# Patient Record
Sex: MALE | Race: WHITE | NOT HISPANIC OR LATINO | URBAN - METROPOLITAN AREA
[De-identification: names, ages, dates, MRNs, and addresses within clinical notes are randomized per-mention and may not be internally consistent; named-entity substitution may affect disease eponyms.]

---

## 2017-06-04 ENCOUNTER — EMERGENCY (EMERGENCY)
Facility: HOSPITAL | Age: 67
LOS: 1 days | Discharge: HOME | End: 2017-06-04
Attending: EMERGENCY MEDICINE | Admitting: EMERGENCY MEDICINE
Payer: COMMERCIAL

## 2017-06-04 VITALS
DIASTOLIC BLOOD PRESSURE: 77 MMHG | TEMPERATURE: 98 F | OXYGEN SATURATION: 96 % | RESPIRATION RATE: 20 BRPM | HEART RATE: 101 BPM | SYSTOLIC BLOOD PRESSURE: 136 MMHG

## 2017-06-04 VITALS
DIASTOLIC BLOOD PRESSURE: 78 MMHG | HEART RATE: 100 BPM | OXYGEN SATURATION: 96 % | RESPIRATION RATE: 18 BRPM | SYSTOLIC BLOOD PRESSURE: 148 MMHG | TEMPERATURE: 97 F

## 2017-06-04 DIAGNOSIS — K80.50 CALCULUS OF BILE DUCT WITHOUT CHOLANGITIS OR CHOLECYSTITIS WITHOUT OBSTRUCTION: ICD-10-CM

## 2017-06-04 DIAGNOSIS — I10 ESSENTIAL (PRIMARY) HYPERTENSION: ICD-10-CM

## 2017-06-04 DIAGNOSIS — E11.9 TYPE 2 DIABETES MELLITUS WITHOUT COMPLICATIONS: ICD-10-CM

## 2017-06-04 DIAGNOSIS — R10.10 UPPER ABDOMINAL PAIN, UNSPECIFIED: ICD-10-CM

## 2017-06-04 DIAGNOSIS — Z79.899 OTHER LONG TERM (CURRENT) DRUG THERAPY: ICD-10-CM

## 2017-06-04 PROBLEM — Z00.00 ENCOUNTER FOR PREVENTIVE HEALTH EXAMINATION: Status: ACTIVE | Noted: 2017-06-04

## 2017-06-04 LAB
ALBUMIN SERPL ELPH-MCNC: 4.4 G/DL — SIGNIFICANT CHANGE UP (ref 3.3–5)
ALP SERPL-CCNC: 82 U/L — SIGNIFICANT CHANGE UP (ref 40–120)
ALT FLD-CCNC: 27 U/L — SIGNIFICANT CHANGE UP (ref 10–45)
ANION GAP SERPL CALC-SCNC: 14 MMOL/L — SIGNIFICANT CHANGE UP (ref 5–17)
AST SERPL-CCNC: 21 U/L — SIGNIFICANT CHANGE UP (ref 10–40)
BASOPHILS NFR BLD AUTO: 0.4 % — SIGNIFICANT CHANGE UP (ref 0–2)
BILIRUB SERPL-MCNC: 0.7 MG/DL — SIGNIFICANT CHANGE UP (ref 0.2–1.2)
BUN SERPL-MCNC: 21 MG/DL — SIGNIFICANT CHANGE UP (ref 7–23)
CALCIUM SERPL-MCNC: 9.4 MG/DL — SIGNIFICANT CHANGE UP (ref 8.4–10.5)
CHLORIDE SERPL-SCNC: 99 MMOL/L — SIGNIFICANT CHANGE UP (ref 96–108)
CO2 SERPL-SCNC: 25 MMOL/L — SIGNIFICANT CHANGE UP (ref 22–31)
CREAT SERPL-MCNC: 0.9 MG/DL — SIGNIFICANT CHANGE UP (ref 0.5–1.3)
EOSINOPHIL NFR BLD AUTO: 11.5 % — HIGH (ref 0–6)
GLUCOSE SERPL-MCNC: 214 MG/DL — HIGH (ref 70–99)
HCT VFR BLD CALC: 48.9 % — SIGNIFICANT CHANGE UP (ref 39–50)
HGB BLD-MCNC: 16.7 G/DL — SIGNIFICANT CHANGE UP (ref 13–17)
LIDOCAIN IGE QN: 50 U/L — SIGNIFICANT CHANGE UP (ref 7–60)
LYMPHOCYTES # BLD AUTO: 20.4 % — SIGNIFICANT CHANGE UP (ref 13–44)
MCHC RBC-ENTMCNC: 31.5 PG — SIGNIFICANT CHANGE UP (ref 27–34)
MCHC RBC-ENTMCNC: 34.2 G/DL — SIGNIFICANT CHANGE UP (ref 32–36)
MCV RBC AUTO: 92.1 FL — SIGNIFICANT CHANGE UP (ref 80–100)
MONOCYTES NFR BLD AUTO: 6.8 % — SIGNIFICANT CHANGE UP (ref 2–14)
NEUTROPHILS NFR BLD AUTO: 60.9 % — SIGNIFICANT CHANGE UP (ref 43–77)
PLATELET # BLD AUTO: 175 K/UL — SIGNIFICANT CHANGE UP (ref 150–400)
POTASSIUM SERPL-MCNC: 4.9 MMOL/L — SIGNIFICANT CHANGE UP (ref 3.5–5.3)
POTASSIUM SERPL-SCNC: 4.9 MMOL/L — SIGNIFICANT CHANGE UP (ref 3.5–5.3)
PROT SERPL-MCNC: 7.1 G/DL — SIGNIFICANT CHANGE UP (ref 6–8.3)
RBC # BLD: 5.31 M/UL — SIGNIFICANT CHANGE UP (ref 4.2–5.8)
RBC # FLD: 12.8 % — SIGNIFICANT CHANGE UP (ref 10.3–16.9)
SODIUM SERPL-SCNC: 138 MMOL/L — SIGNIFICANT CHANGE UP (ref 135–145)
WBC # BLD: 19.2 K/UL — HIGH (ref 3.8–10.5)
WBC # FLD AUTO: 19.2 K/UL — HIGH (ref 3.8–10.5)

## 2017-06-04 PROCEDURE — 85025 COMPLETE CBC W/AUTO DIFF WBC: CPT

## 2017-06-04 PROCEDURE — 99284 EMERGENCY DEPT VISIT MOD MDM: CPT | Mod: 25

## 2017-06-04 PROCEDURE — 83690 ASSAY OF LIPASE: CPT

## 2017-06-04 PROCEDURE — 76705 ECHO EXAM OF ABDOMEN: CPT

## 2017-06-04 PROCEDURE — 80053 COMPREHEN METABOLIC PANEL: CPT

## 2017-06-04 PROCEDURE — 99285 EMERGENCY DEPT VISIT HI MDM: CPT | Mod: 25

## 2017-06-04 PROCEDURE — 96374 THER/PROPH/DIAG INJ IV PUSH: CPT

## 2017-06-04 PROCEDURE — 96375 TX/PRO/DX INJ NEW DRUG ADDON: CPT

## 2017-06-04 PROCEDURE — 36415 COLL VENOUS BLD VENIPUNCTURE: CPT

## 2017-06-04 PROCEDURE — 76705 ECHO EXAM OF ABDOMEN: CPT | Mod: 26

## 2017-06-04 RX ORDER — LISINOPRIL 2.5 MG/1
1 TABLET ORAL
Qty: 0 | Refills: 0 | COMMUNITY

## 2017-06-04 RX ORDER — SODIUM CHLORIDE 9 MG/ML
1000 INJECTION INTRAMUSCULAR; INTRAVENOUS; SUBCUTANEOUS
Qty: 0 | Refills: 0 | Status: DISCONTINUED | OUTPATIENT
Start: 2017-06-04 | End: 2017-06-08

## 2017-06-04 RX ORDER — AMLODIPINE BESYLATE 2.5 MG/1
1 TABLET ORAL
Qty: 0 | Refills: 0 | COMMUNITY

## 2017-06-04 RX ORDER — FAMOTIDINE 10 MG/ML
20 INJECTION INTRAVENOUS ONCE
Qty: 0 | Refills: 0 | Status: COMPLETED | OUTPATIENT
Start: 2017-06-04 | End: 2017-06-04

## 2017-06-04 RX ORDER — MORPHINE SULFATE 50 MG/1
4 CAPSULE, EXTENDED RELEASE ORAL ONCE
Qty: 0 | Refills: 0 | Status: DISCONTINUED | OUTPATIENT
Start: 2017-06-04 | End: 2017-06-04

## 2017-06-04 RX ORDER — ONDANSETRON 8 MG/1
4 TABLET, FILM COATED ORAL ONCE
Qty: 0 | Refills: 0 | Status: COMPLETED | OUTPATIENT
Start: 2017-06-04 | End: 2017-06-04

## 2017-06-04 RX ORDER — SODIUM CHLORIDE 9 MG/ML
1000 INJECTION INTRAMUSCULAR; INTRAVENOUS; SUBCUTANEOUS ONCE
Qty: 0 | Refills: 0 | Status: COMPLETED | OUTPATIENT
Start: 2017-06-04 | End: 2017-06-04

## 2017-06-04 RX ADMIN — MORPHINE SULFATE 4 MILLIGRAM(S): 50 CAPSULE, EXTENDED RELEASE ORAL at 03:20

## 2017-06-04 RX ADMIN — SODIUM CHLORIDE 125 MILLILITER(S): 9 INJECTION INTRAMUSCULAR; INTRAVENOUS; SUBCUTANEOUS at 03:30

## 2017-06-04 RX ADMIN — FAMOTIDINE 20 MILLIGRAM(S): 10 INJECTION INTRAVENOUS at 02:55

## 2017-06-04 RX ADMIN — ONDANSETRON 4 MILLIGRAM(S): 8 TABLET, FILM COATED ORAL at 02:55

## 2017-06-04 RX ADMIN — MORPHINE SULFATE 4 MILLIGRAM(S): 50 CAPSULE, EXTENDED RELEASE ORAL at 02:55

## 2017-06-04 RX ADMIN — SODIUM CHLORIDE 2000 MILLILITER(S): 9 INJECTION INTRAMUSCULAR; INTRAVENOUS; SUBCUTANEOUS at 02:54

## 2017-06-04 NOTE — ED PROVIDER NOTE - OBJECTIVE STATEMENT
65 yo male h/o htn, dm c/o acute onset severe 9/10 sharp upper abd pain that he attributed to eating crescencio.  H/o similar pain once before w crescencio.  + h/o fried food intolerance.  No h/o gallstone, renal stone, pud.  No relief w gas medication at home.  No gerd sx, + n/v x 2 (nonbloody), no fever, no dysuria/freq/urgency.  Nl bm this evening w/o black/bloody stool.  No cp, palpitations, sob.

## 2017-06-04 NOTE — ED PROVIDER NOTE - PROGRESS NOTE DETAILS
WBC elevated but chem/lft's wnl, u/s w + gallstone w/o cholecystitis, pt states pain improved and no longer tender ruq/epigastric on repeat exam.  Will dc - discussed biliary colic, diet recommendations, reasons for return and fu w pmd, surg.  Pt afebrile, nontender, no infection on u/s - will hold on abx at this time.

## 2017-06-04 NOTE — ED ADULT NURSE NOTE - OBJECTIVE STATEMENT
67 y/o male presenting to ER c/o RUQ pain since this  morning, pt reports pain level 6/10, described as dull and constant at present time. PT denies vomiting but feeling nauseous. Abdomen soft, bowel sound present to all quadrants. IV line placed, blood work done and sent to lab, medications given as per MD Director's orders. awaiting US. Will continue to monitor.

## 2017-06-04 NOTE — ED ADULT TRIAGE NOTE - CHIEF COMPLAINT QUOTE
Pt presents with abd pain since last night.  Upon arrival NAD pt points with mid abd for pain 9/10.  Pt denies N/V/D

## 2017-06-04 NOTE — ED PROVIDER NOTE - MEDICAL DECISION MAKING DETAILS
Pt c/o abd pain w ttp ruq > epigastric, suspect biliary colic vs cholecystitis, poss gastritis, pancreatitis, no diarrhea to suggest colitis/enteritis.  Plan labs, pain/nausea meds, ruq u/s, reassess.

## 2017-07-05 VITALS
DIASTOLIC BLOOD PRESSURE: 64 MMHG | OXYGEN SATURATION: 96 % | TEMPERATURE: 98 F | RESPIRATION RATE: 18 BRPM | WEIGHT: 161.82 LBS | HEIGHT: 67 IN | SYSTOLIC BLOOD PRESSURE: 121 MMHG | HEART RATE: 90 BPM

## 2017-07-06 ENCOUNTER — INPATIENT (INPATIENT)
Facility: HOSPITAL | Age: 67
LOS: 3 days | Discharge: HOME | DRG: 419 | End: 2017-07-10
Attending: SURGERY | Admitting: SURGERY
Payer: COMMERCIAL

## 2017-07-06 ENCOUNTER — RESULT REVIEW (OUTPATIENT)
Age: 67
End: 2017-07-06

## 2017-07-06 DIAGNOSIS — K81.1 CHRONIC CHOLECYSTITIS: ICD-10-CM

## 2017-07-06 LAB
ALBUMIN SERPL ELPH-MCNC: 3.8 G/DL — SIGNIFICANT CHANGE UP (ref 3.3–5)
ALP SERPL-CCNC: 97 U/L — SIGNIFICANT CHANGE UP (ref 40–120)
ALT FLD-CCNC: 83 U/L — HIGH (ref 10–45)
ANION GAP SERPL CALC-SCNC: 11 MMOL/L — SIGNIFICANT CHANGE UP (ref 5–17)
AST SERPL-CCNC: 67 U/L — HIGH (ref 10–40)
BASOPHILS NFR BLD AUTO: 0.3 % — SIGNIFICANT CHANGE UP (ref 0–2)
BILIRUB SERPL-MCNC: 1.4 MG/DL — HIGH (ref 0.2–1.2)
BUN SERPL-MCNC: 7 MG/DL — SIGNIFICANT CHANGE UP (ref 7–23)
CALCIUM SERPL-MCNC: 8.6 MG/DL — SIGNIFICANT CHANGE UP (ref 8.4–10.5)
CHLORIDE SERPL-SCNC: 99 MMOL/L — SIGNIFICANT CHANGE UP (ref 96–108)
CO2 SERPL-SCNC: 26 MMOL/L — SIGNIFICANT CHANGE UP (ref 22–31)
CREAT SERPL-MCNC: 0.9 MG/DL — SIGNIFICANT CHANGE UP (ref 0.5–1.3)
EOSINOPHIL NFR BLD AUTO: 6.8 % — HIGH (ref 0–6)
GLUCOSE SERPL-MCNC: 118 MG/DL — HIGH (ref 70–99)
GRAM STN FLD: SIGNIFICANT CHANGE UP
HCT VFR BLD CALC: 43.9 % — SIGNIFICANT CHANGE UP (ref 39–50)
HGB BLD-MCNC: 14.8 G/DL — SIGNIFICANT CHANGE UP (ref 13–17)
LYMPHOCYTES # BLD AUTO: 22.9 % — SIGNIFICANT CHANGE UP (ref 13–44)
MAGNESIUM SERPL-MCNC: 1.2 MG/DL — LOW (ref 1.6–2.6)
MCHC RBC-ENTMCNC: 30.8 PG — SIGNIFICANT CHANGE UP (ref 27–34)
MCHC RBC-ENTMCNC: 33.7 G/DL — SIGNIFICANT CHANGE UP (ref 32–36)
MCV RBC AUTO: 91.5 FL — SIGNIFICANT CHANGE UP (ref 80–100)
MONOCYTES NFR BLD AUTO: 7.6 % — SIGNIFICANT CHANGE UP (ref 2–14)
NEUTROPHILS NFR BLD AUTO: 62.4 % — SIGNIFICANT CHANGE UP (ref 43–77)
PHOSPHATE SERPL-MCNC: 3.5 MG/DL — SIGNIFICANT CHANGE UP (ref 2.5–4.5)
PLATELET # BLD AUTO: 157 K/UL — SIGNIFICANT CHANGE UP (ref 150–400)
POTASSIUM SERPL-MCNC: 4.1 MMOL/L — SIGNIFICANT CHANGE UP (ref 3.5–5.3)
POTASSIUM SERPL-SCNC: 4.1 MMOL/L — SIGNIFICANT CHANGE UP (ref 3.5–5.3)
PROT SERPL-MCNC: 6.2 G/DL — SIGNIFICANT CHANGE UP (ref 6–8.3)
RBC # BLD: 4.8 M/UL — SIGNIFICANT CHANGE UP (ref 4.2–5.8)
RBC # FLD: 12.9 % — SIGNIFICANT CHANGE UP (ref 10.3–16.9)
SODIUM SERPL-SCNC: 136 MMOL/L — SIGNIFICANT CHANGE UP (ref 135–145)
SPECIMEN SOURCE: SIGNIFICANT CHANGE UP
WBC # BLD: 14.6 K/UL — HIGH (ref 3.8–10.5)
WBC # FLD AUTO: 14.6 K/UL — HIGH (ref 3.8–10.5)

## 2017-07-06 RX ORDER — GLUCAGON INJECTION, SOLUTION 0.5 MG/.1ML
1 INJECTION, SOLUTION SUBCUTANEOUS ONCE
Qty: 0 | Refills: 0 | Status: DISCONTINUED | OUTPATIENT
Start: 2017-07-06 | End: 2017-07-10

## 2017-07-06 RX ORDER — ONDANSETRON 8 MG/1
4 TABLET, FILM COATED ORAL EVERY 6 HOURS
Qty: 0 | Refills: 0 | Status: DISCONTINUED | OUTPATIENT
Start: 2017-07-06 | End: 2017-07-10

## 2017-07-06 RX ORDER — HEPARIN SODIUM 5000 [USP'U]/ML
5000 INJECTION INTRAVENOUS; SUBCUTANEOUS EVERY 8 HOURS
Qty: 0 | Refills: 0 | Status: DISCONTINUED | OUTPATIENT
Start: 2017-07-06 | End: 2017-07-10

## 2017-07-06 RX ORDER — MAGNESIUM SULFATE 500 MG/ML
2 VIAL (ML) INJECTION EVERY 6 HOURS
Qty: 0 | Refills: 0 | Status: COMPLETED | OUTPATIENT
Start: 2017-07-06 | End: 2017-07-07

## 2017-07-06 RX ORDER — INSULIN LISPRO 100/ML
VIAL (ML) SUBCUTANEOUS
Qty: 0 | Refills: 0 | Status: DISCONTINUED | OUTPATIENT
Start: 2017-07-06 | End: 2017-07-10

## 2017-07-06 RX ORDER — PIPERACILLIN AND TAZOBACTAM 4; .5 G/20ML; G/20ML
3.38 INJECTION, POWDER, LYOPHILIZED, FOR SOLUTION INTRAVENOUS ONCE
Qty: 0 | Refills: 0 | Status: COMPLETED | OUTPATIENT
Start: 2017-07-06 | End: 2017-07-06

## 2017-07-06 RX ORDER — PIPERACILLIN AND TAZOBACTAM 4; .5 G/20ML; G/20ML
3.38 INJECTION, POWDER, LYOPHILIZED, FOR SOLUTION INTRAVENOUS EVERY 6 HOURS
Qty: 0 | Refills: 0 | Status: DISCONTINUED | OUTPATIENT
Start: 2017-07-06 | End: 2017-07-10

## 2017-07-06 RX ORDER — DEXTROSE 50 % IN WATER 50 %
25 SYRINGE (ML) INTRAVENOUS ONCE
Qty: 0 | Refills: 0 | Status: DISCONTINUED | OUTPATIENT
Start: 2017-07-06 | End: 2017-07-10

## 2017-07-06 RX ORDER — OXYCODONE AND ACETAMINOPHEN 5; 325 MG/1; MG/1
2 TABLET ORAL EVERY 4 HOURS
Qty: 0 | Refills: 0 | Status: DISCONTINUED | OUTPATIENT
Start: 2017-07-06 | End: 2017-07-10

## 2017-07-06 RX ORDER — SODIUM CHLORIDE 9 MG/ML
1000 INJECTION, SOLUTION INTRAVENOUS
Qty: 0 | Refills: 0 | Status: DISCONTINUED | OUTPATIENT
Start: 2017-07-06 | End: 2017-07-10

## 2017-07-06 RX ORDER — HYDROMORPHONE HYDROCHLORIDE 2 MG/ML
0.5 INJECTION INTRAMUSCULAR; INTRAVENOUS; SUBCUTANEOUS ONCE
Qty: 0 | Refills: 0 | Status: DISCONTINUED | OUTPATIENT
Start: 2017-07-06 | End: 2017-07-10

## 2017-07-06 RX ORDER — OXYCODONE AND ACETAMINOPHEN 5; 325 MG/1; MG/1
1 TABLET ORAL EVERY 4 HOURS
Qty: 0 | Refills: 0 | Status: DISCONTINUED | OUTPATIENT
Start: 2017-07-06 | End: 2017-07-10

## 2017-07-06 RX ORDER — SODIUM CHLORIDE 9 MG/ML
1000 INJECTION, SOLUTION INTRAVENOUS
Qty: 0 | Refills: 0 | Status: DISCONTINUED | OUTPATIENT
Start: 2017-07-06 | End: 2017-07-09

## 2017-07-06 RX ORDER — DEXTROSE 50 % IN WATER 50 %
1 SYRINGE (ML) INTRAVENOUS ONCE
Qty: 0 | Refills: 0 | Status: DISCONTINUED | OUTPATIENT
Start: 2017-07-06 | End: 2017-07-10

## 2017-07-06 RX ORDER — DEXTROSE 50 % IN WATER 50 %
12.5 SYRINGE (ML) INTRAVENOUS ONCE
Qty: 0 | Refills: 0 | Status: DISCONTINUED | OUTPATIENT
Start: 2017-07-06 | End: 2017-07-10

## 2017-07-06 RX ORDER — TAMSULOSIN HYDROCHLORIDE 0.4 MG/1
0.4 CAPSULE ORAL AT BEDTIME
Qty: 0 | Refills: 0 | Status: DISCONTINUED | OUTPATIENT
Start: 2017-07-06 | End: 2017-07-10

## 2017-07-06 RX ADMIN — PIPERACILLIN AND TAZOBACTAM 200 GRAM(S): 4; .5 INJECTION, POWDER, LYOPHILIZED, FOR SOLUTION INTRAVENOUS at 18:00

## 2017-07-06 RX ADMIN — TAMSULOSIN HYDROCHLORIDE 0.4 MILLIGRAM(S): 0.4 CAPSULE ORAL at 22:37

## 2017-07-06 RX ADMIN — Medication 50 GRAM(S): at 23:42

## 2017-07-06 RX ADMIN — HEPARIN SODIUM 5000 UNIT(S): 5000 INJECTION INTRAVENOUS; SUBCUTANEOUS at 22:31

## 2017-07-06 RX ADMIN — PIPERACILLIN AND TAZOBACTAM 200 GRAM(S): 4; .5 INJECTION, POWDER, LYOPHILIZED, FOR SOLUTION INTRAVENOUS at 12:33

## 2017-07-06 RX ADMIN — HEPARIN SODIUM 5000 UNIT(S): 5000 INJECTION INTRAVENOUS; SUBCUTANEOUS at 14:00

## 2017-07-06 RX ADMIN — Medication: at 10:17

## 2017-07-06 NOTE — BRIEF OPERATIVE NOTE - OPERATION/FINDINGS
Top down approach for gangrenous cholecystitis identified. Clip and Endoloop used for cystic duct; clip x 2 used for cystic artery.

## 2017-07-06 NOTE — PROGRESS NOTE ADULT - ASSESSMENT
Assessment:66y Male s/p Laprascopic Cholecystectomy    Plan:  Pain/nausea control PRN  Home meds  Zosyn  Strict I & O's   SSI  Incentive spirometer/OOB/Ambulate  IVF, Diet: CLD  DVT PPx  AM labs  ToV @ 7 PM

## 2017-07-06 NOTE — PROGRESS NOTE ADULT - SUBJECTIVE AND OBJECTIVE BOX
Team 1 Surgery Post-Op Note, PCN:     Pre-Op Dx: Cholecystitis  Procedure: Cholecystectomy, laparoscopic    Surgeon: Dr. Chicas    Subjective: Doing well post-op. No N/V/D. Tolerating CLD. No complaints of pain.       Vital Signs Last 24 Hrs  T(C): 36.6 (06 Jul 2017 09:45), Max: 36.6 (06 Jul 2017 09:45)  T(F): 97.9 (06 Jul 2017 09:45), Max: 97.9 (06 Jul 2017 09:45)  HR: 93 (06 Jul 2017 09:45) (93 - 93)  BP: 108/62 (06 Jul 2017 09:45) (108/62 - 108/62)  BP(mean): --  RR: 16 (06 Jul 2017 09:45) (16 - 16)  SpO2: 96% (06 Jul 2017 09:45) (96% - 96%)    Physical Exam:  General: NAD, resting comfortably in bed  Pulmonary: Nonlabored breathing, no respiratory distress  Cardiovascular: NSR  Abdominal: soft, NT/ND  Extremities: WWP, normal strength  Neuro: A/O x 3, CNs II-XII grossly intact, no focal deficits, normal sensation  Pulses: palpable distal pulses      LABS:            CAPILLARY BLOOD GLUCOSE  211 (06 Jul 2017 09:45)      Radiology and Additional Studies:

## 2017-07-06 NOTE — H&P ADULT - HISTORY OF PRESENT ILLNESS
66YOM with HTN and DM (not insulin dependent) who presents with chronic abdominal pain due to chronic cholecystitis. He is asymptomatic upon presentation today.

## 2017-07-06 NOTE — H&P ADULT - PROBLEM SELECTOR PLAN 1
- Admit to surgery.   - Pain control. Nausea control.   - IVF.   - IS.   - CLD.   - I&Os.   - Zosyn.   - SSI.   - DVT prophylaxis.

## 2017-07-07 LAB
ALBUMIN SERPL ELPH-MCNC: 3.2 G/DL — LOW (ref 3.3–5)
ALBUMIN SERPL ELPH-MCNC: 3.4 G/DL — SIGNIFICANT CHANGE UP (ref 3.3–5)
ALP SERPL-CCNC: 108 U/L — SIGNIFICANT CHANGE UP (ref 40–120)
ALP SERPL-CCNC: 165 U/L — HIGH (ref 40–120)
ALT FLD-CCNC: 78 U/L — HIGH (ref 10–45)
ALT FLD-CCNC: 91 U/L — HIGH (ref 10–45)
ANION GAP SERPL CALC-SCNC: 12 MMOL/L — SIGNIFICANT CHANGE UP (ref 5–17)
ANION GAP SERPL CALC-SCNC: 12 MMOL/L — SIGNIFICANT CHANGE UP (ref 5–17)
AST SERPL-CCNC: 60 U/L — HIGH (ref 10–40)
AST SERPL-CCNC: 88 U/L — HIGH (ref 10–40)
BILIRUB SERPL-MCNC: 2.1 MG/DL — HIGH (ref 0.2–1.2)
BILIRUB SERPL-MCNC: 3.9 MG/DL — HIGH (ref 0.2–1.2)
BUN SERPL-MCNC: 7 MG/DL — SIGNIFICANT CHANGE UP (ref 7–23)
BUN SERPL-MCNC: 8 MG/DL — SIGNIFICANT CHANGE UP (ref 7–23)
CALCIUM SERPL-MCNC: 8.5 MG/DL — SIGNIFICANT CHANGE UP (ref 8.4–10.5)
CALCIUM SERPL-MCNC: 8.6 MG/DL — SIGNIFICANT CHANGE UP (ref 8.4–10.5)
CHLORIDE SERPL-SCNC: 96 MMOL/L — SIGNIFICANT CHANGE UP (ref 96–108)
CHLORIDE SERPL-SCNC: 99 MMOL/L — SIGNIFICANT CHANGE UP (ref 96–108)
CO2 SERPL-SCNC: 26 MMOL/L — SIGNIFICANT CHANGE UP (ref 22–31)
CO2 SERPL-SCNC: 27 MMOL/L — SIGNIFICANT CHANGE UP (ref 22–31)
CREAT SERPL-MCNC: 0.9 MG/DL — SIGNIFICANT CHANGE UP (ref 0.5–1.3)
CREAT SERPL-MCNC: 0.9 MG/DL — SIGNIFICANT CHANGE UP (ref 0.5–1.3)
GLUCOSE SERPL-MCNC: 154 MG/DL — HIGH (ref 70–99)
GLUCOSE SERPL-MCNC: 189 MG/DL — HIGH (ref 70–99)
HBA1C BLD-MCNC: 6.4 % — HIGH (ref 4–5.6)
HCT VFR BLD CALC: 43.1 % — SIGNIFICANT CHANGE UP (ref 39–50)
HCT VFR BLD CALC: 43.4 % — SIGNIFICANT CHANGE UP (ref 39–50)
HGB BLD-MCNC: 14.3 G/DL — SIGNIFICANT CHANGE UP (ref 13–17)
HGB BLD-MCNC: 14.7 G/DL — SIGNIFICANT CHANGE UP (ref 13–17)
MAGNESIUM SERPL-MCNC: 1.6 MG/DL — SIGNIFICANT CHANGE UP (ref 1.6–2.6)
MAGNESIUM SERPL-MCNC: 2.6 MG/DL — SIGNIFICANT CHANGE UP (ref 1.6–2.6)
MCHC RBC-ENTMCNC: 30.5 PG — SIGNIFICANT CHANGE UP (ref 27–34)
MCHC RBC-ENTMCNC: 31.3 PG — SIGNIFICANT CHANGE UP (ref 27–34)
MCHC RBC-ENTMCNC: 33.2 G/DL — SIGNIFICANT CHANGE UP (ref 32–36)
MCHC RBC-ENTMCNC: 33.9 G/DL — SIGNIFICANT CHANGE UP (ref 32–36)
MCV RBC AUTO: 91.9 FL — SIGNIFICANT CHANGE UP (ref 80–100)
MCV RBC AUTO: 92.5 FL — SIGNIFICANT CHANGE UP (ref 80–100)
PHOSPHATE SERPL-MCNC: 2.8 MG/DL — SIGNIFICANT CHANGE UP (ref 2.5–4.5)
PHOSPHATE SERPL-MCNC: 3.2 MG/DL — SIGNIFICANT CHANGE UP (ref 2.5–4.5)
PLATELET # BLD AUTO: 128 K/UL — LOW (ref 150–400)
PLATELET # BLD AUTO: 152 K/UL — SIGNIFICANT CHANGE UP (ref 150–400)
POTASSIUM SERPL-MCNC: 4.1 MMOL/L — SIGNIFICANT CHANGE UP (ref 3.5–5.3)
POTASSIUM SERPL-MCNC: 4.3 MMOL/L — SIGNIFICANT CHANGE UP (ref 3.5–5.3)
POTASSIUM SERPL-SCNC: 4.1 MMOL/L — SIGNIFICANT CHANGE UP (ref 3.5–5.3)
POTASSIUM SERPL-SCNC: 4.3 MMOL/L — SIGNIFICANT CHANGE UP (ref 3.5–5.3)
PROT SERPL-MCNC: 5.9 G/DL — LOW (ref 6–8.3)
PROT SERPL-MCNC: 6.2 G/DL — SIGNIFICANT CHANGE UP (ref 6–8.3)
RBC # BLD: 4.69 M/UL — SIGNIFICANT CHANGE UP (ref 4.2–5.8)
RBC # BLD: 4.69 M/UL — SIGNIFICANT CHANGE UP (ref 4.2–5.8)
RBC # FLD: 12.9 % — SIGNIFICANT CHANGE UP (ref 10.3–16.9)
RBC # FLD: 13.5 % — SIGNIFICANT CHANGE UP (ref 10.3–16.9)
SODIUM SERPL-SCNC: 135 MMOL/L — SIGNIFICANT CHANGE UP (ref 135–145)
SODIUM SERPL-SCNC: 137 MMOL/L — SIGNIFICANT CHANGE UP (ref 135–145)
WBC # BLD: 11.2 K/UL — HIGH (ref 3.8–10.5)
WBC # BLD: 12.2 K/UL — HIGH (ref 3.8–10.5)
WBC # FLD AUTO: 11.2 K/UL — HIGH (ref 3.8–10.5)
WBC # FLD AUTO: 12.2 K/UL — HIGH (ref 3.8–10.5)

## 2017-07-07 RX ORDER — MAGNESIUM SULFATE 500 MG/ML
2 VIAL (ML) INJECTION EVERY 6 HOURS
Qty: 0 | Refills: 0 | Status: DISCONTINUED | OUTPATIENT
Start: 2017-07-07 | End: 2017-07-07

## 2017-07-07 RX ORDER — INDOMETHACIN 50 MG
50 CAPSULE ORAL DAILY
Qty: 0 | Refills: 0 | Status: COMPLETED | OUTPATIENT
Start: 2017-07-07 | End: 2017-07-09

## 2017-07-07 RX ADMIN — TAMSULOSIN HYDROCHLORIDE 0.4 MILLIGRAM(S): 0.4 CAPSULE ORAL at 23:06

## 2017-07-07 RX ADMIN — PIPERACILLIN AND TAZOBACTAM 200 GRAM(S): 4; .5 INJECTION, POWDER, LYOPHILIZED, FOR SOLUTION INTRAVENOUS at 00:49

## 2017-07-07 RX ADMIN — Medication 50 GRAM(S): at 12:55

## 2017-07-07 RX ADMIN — PIPERACILLIN AND TAZOBACTAM 200 GRAM(S): 4; .5 INJECTION, POWDER, LYOPHILIZED, FOR SOLUTION INTRAVENOUS at 06:20

## 2017-07-07 RX ADMIN — OXYCODONE AND ACETAMINOPHEN 2 TABLET(S): 5; 325 TABLET ORAL at 08:37

## 2017-07-07 RX ADMIN — Medication 4: at 08:37

## 2017-07-07 RX ADMIN — OXYCODONE AND ACETAMINOPHEN 2 TABLET(S): 5; 325 TABLET ORAL at 00:48

## 2017-07-07 RX ADMIN — OXYCODONE AND ACETAMINOPHEN 2 TABLET(S): 5; 325 TABLET ORAL at 01:48

## 2017-07-07 RX ADMIN — SODIUM CHLORIDE 120 MILLILITER(S): 9 INJECTION, SOLUTION INTRAVENOUS at 12:55

## 2017-07-07 RX ADMIN — Medication 50 GRAM(S): at 05:27

## 2017-07-07 RX ADMIN — OXYCODONE AND ACETAMINOPHEN 2 TABLET(S): 5; 325 TABLET ORAL at 09:00

## 2017-07-07 RX ADMIN — HEPARIN SODIUM 5000 UNIT(S): 5000 INJECTION INTRAVENOUS; SUBCUTANEOUS at 12:55

## 2017-07-07 RX ADMIN — PIPERACILLIN AND TAZOBACTAM 200 GRAM(S): 4; .5 INJECTION, POWDER, LYOPHILIZED, FOR SOLUTION INTRAVENOUS at 12:55

## 2017-07-07 RX ADMIN — HEPARIN SODIUM 5000 UNIT(S): 5000 INJECTION INTRAVENOUS; SUBCUTANEOUS at 05:27

## 2017-07-07 NOTE — PROGRESS NOTE ADULT - ASSESSMENT
ABD SOFT, DISTENDED, BS-, FLATUS-, BM-, WOUNDS DRY INTACT.  TOTAL BILIRUBIN 2.1, FOR ERCP WITH GI.  NPO, IVF, IV ABX, DVT PROFILAXIS.

## 2017-07-07 NOTE — PROGRESS NOTE ADULT - ASSESSMENT
67 y/o M s/p laprascopic cholecystectomy for chronic cholecystitis, found to be gangrenous     - Pain control. Nausea control.   - IVF.   - IS.   - CLD.   - I&Os.   - Zosyn.   - SSI.   - DVT prophylaxis.   - Guillermo to gravity 67 y/o M s/p laprascopic cholecystectomy for chronic cholecystitis, found to be gangrenous     - Pain control. Nausea control.   - IVF.   - IS.   - NPO.   - I&Os.   - Zosyn.   - SSI.   - DVT prophylaxis.   - Guillermo to gravity

## 2017-07-07 NOTE — CONSULT NOTE ADULT - ASSESSMENT
66 year old male s/p cholecystectomy with rising T-bili, concern for retained biliary sludge. Will plan for ERCP today.     -NPO  -ERCP today.     case d/w Dr. Horn

## 2017-07-07 NOTE — CONSULT NOTE ADULT - SUBJECTIVE AND OBJECTIVE BOX
HPI:  66YOM with HTN and DM (not insulin dependent) who presents with chronic abdominal pain due to chronic cholecystitis. He is asymptomatic upon presentation today. Pt s/p cholecystectomy with concern for retained biliary sludge. GI consulted to evalute for possible ERCP. pt denies abd pain, n/v, f/c.        PAST MEDICAL & SURGICAL HISTORY:  Diabetes  HTN (hypertension)  No significant past surgical history          MEDICATIONS  (STANDING):  heparin  Injectable 5000 Unit(s) SubCutaneous every 8 hours  lactated ringers. 1000 milliLiter(s) (120 mL/Hr) IV Continuous <Continuous>  insulin lispro (HumaLOG) corrective regimen sliding scale   SubCutaneous Before meals and at bedtime  dextrose 5%. 1000 milliLiter(s) (50 mL/Hr) IV Continuous <Continuous>  dextrose 50% Injectable 12.5 Gram(s) IV Push once  dextrose 50% Injectable 25 Gram(s) IV Push once  dextrose 50% Injectable 25 Gram(s) IV Push once  piperacillin/tazobactam IVPB. 3.375 Gram(s) IV Intermittent every 6 hours  tamsulosin 0.4 milliGRAM(s) Oral at bedtime  magnesium sulfate  IVPB 2 Gram(s) IV Intermittent every 6 hours    MEDICATIONS  (PRN):  ondansetron Injectable 4 milliGRAM(s) IV Push every 6 hours PRN Nausea  dextrose Gel 1 Dose(s) Oral once PRN Blood Glucose LESS THAN 70 milliGRAM(s)/deciliter  glucagon  Injectable 1 milliGRAM(s) IntraMuscular once PRN Glucose LESS THAN 70 milligrams/deciliter  oxyCODONE    5 mG/acetaminophen 325 mG 1 Tablet(s) Oral every 4 hours PRN Moderate Pain (4 - 6)  oxyCODONE    5 mG/acetaminophen 325 mG 2 Tablet(s) Oral every 4 hours PRN Severe Pain (7 - 10)  HYDROmorphone  Injectable 0.5 milliGRAM(s) IV Push once PRN breakthrough      Allergies    No Known Allergies    Intolerances        SOCIAL HISTORY: denies    FAMILY HISTORY: denies GI malignancy       Vital Signs Last 24 Hrs  T(C): 36.3 (07 Jul 2017 08:00), Max: 37.1 (06 Jul 2017 20:56)  T(F): 97.4 (07 Jul 2017 08:00), Max: 98.7 (06 Jul 2017 20:56)  HR: 83 (07 Jul 2017 08:00) (82 - 97)  BP: 127/74 (07 Jul 2017 08:00) (107/63 - 127/74)  BP(mean): --  RR: 16 (07 Jul 2017 08:00) (16 - 16)  SpO2: 95% (07 Jul 2017 08:00) (94% - 95%)    PHYSICAL EXAM:    GEN: well appearing, NAD, AOx3  HEENT: anicteric  CHEST: equal chest rise b/l  CVS: no m/r/g  ABD: soft, nt, nd bs+, surgical site c/d/i      LABS:                        14.7   12.2  )-----------( 152      ( 07 Jul 2017 07:13 )             43.4     07-07    137  |  99  |  7   ----------------------------<  154<H>  4.1   |  26  |  0.90    Ca    8.6      07 Jul 2017 07:13  Phos  3.2     07-07  Mg     2.6     07-07    TPro  6.2  /  Alb  3.4  /  TBili  2.1<H>  /  DBili  x   /  AST  60<H>  /  ALT  78<H>  /  AlkPhos  108  07-07          RADIOLOGY & ADDITIONAL STUDIES:

## 2017-07-07 NOTE — CONSULT NOTE ADULT - ATTENDING COMMENTS
Pt was seen and examined. Agree with the above note. Will proceed with ERCP ti rule out retained stone in the CBD

## 2017-07-07 NOTE — PROGRESS NOTE ADULT - SUBJECTIVE AND OBJECTIVE BOX
INTERVAL HPI/OVERNIGHT EVENTS:   SURGERY ATTENDING    STATUS POST:  LAPAROSCOPIC CHOLECYSTECTOMY, LYSIS OF ADCHISIONS    POST OPERATIVE DAY #: 1    SUBJECTIVE:  Flatus: [ ] YES [X ] NO             Bowel Movement: [ ] YES [X ] NO  Pain (0-10):       2     Pain Control Adequate: [X ] YES [ ] NO  Nausea: [ ] YES [X ] NO            Vomiting: [ ] YES [X ] NO  Diarrhea: [ ] YES [X ] NO         Constipation: [ ] YES [X ] NO     Chest Pain: [ ] YES [X ] NO    SOB:  [ ] YES [X ] NO    MEDICATIONS  (STANDING):  heparin  Injectable 5000 Unit(s) SubCutaneous every 8 hours  lactated ringers. 1000 milliLiter(s) (120 mL/Hr) IV Continuous <Continuous>  insulin lispro (HumaLOG) corrective regimen sliding scale   SubCutaneous Before meals and at bedtime  dextrose 5%. 1000 milliLiter(s) (50 mL/Hr) IV Continuous <Continuous>  dextrose 50% Injectable 12.5 Gram(s) IV Push once  dextrose 50% Injectable 25 Gram(s) IV Push once  dextrose 50% Injectable 25 Gram(s) IV Push once  piperacillin/tazobactam IVPB. 3.375 Gram(s) IV Intermittent every 6 hours  tamsulosin 0.4 milliGRAM(s) Oral at bedtime    MEDICATIONS  (PRN):  ondansetron Injectable 4 milliGRAM(s) IV Push every 6 hours PRN Nausea  dextrose Gel 1 Dose(s) Oral once PRN Blood Glucose LESS THAN 70 milliGRAM(s)/deciliter  glucagon  Injectable 1 milliGRAM(s) IntraMuscular once PRN Glucose LESS THAN 70 milligrams/deciliter  oxyCODONE    5 mG/acetaminophen 325 mG 1 Tablet(s) Oral every 4 hours PRN Moderate Pain (4 - 6)  oxyCODONE    5 mG/acetaminophen 325 mG 2 Tablet(s) Oral every 4 hours PRN Severe Pain (7 - 10)  HYDROmorphone  Injectable 0.5 milliGRAM(s) IV Push once PRN breakthrough      Vital Signs Last 24 Hrs  T(C): 36.6 (07 Jul 2017 12:42), Max: 37.1 (06 Jul 2017 20:56)  T(F): 97.9 (07 Jul 2017 12:42), Max: 98.7 (06 Jul 2017 20:56)  HR: 85 (07 Jul 2017 12:42) (82 - 97)  BP: 124/74 (07 Jul 2017 12:42) (107/63 - 127/74)  BP(mean): --  RR: 16 (07 Jul 2017 12:42) (16 - 16)  SpO2: 95% (07 Jul 2017 12:42) (94% - 95%)          I&O's Detail    06 Jul 2017 07:01  -  07 Jul 2017 07:00  --------------------------------------------------------  IN:    lactated ringers.: 840 mL    Solution: 100 mL    Solution: 100 mL  Total IN: 1040 mL    OUT:    Indwelling Catheter - Urethral: 3350 mL  Total OUT: 3350 mL    Total NET: -2310 mL      07 Jul 2017 07:01  -  07 Jul 2017 17:44  --------------------------------------------------------  IN:  Total IN: 0 mL    OUT:    Indwelling Catheter - Urethral: 875 mL  Total OUT: 875 mL    Total NET: -875 mL          LABS:                        14.7   12.2  )-----------( 152      ( 07 Jul 2017 07:13 )             43.4     07-07    137  |  99  |  7   ----------------------------<  154<H>  4.1   |  26  |  0.90    Ca    8.6      07 Jul 2017 07:13  Phos  3.2     07-07  Mg     2.6     07-07    TPro  6.2  /  Alb  3.4  /  TBili  2.1<H>  /  DBili  x   /  AST  60<H>  /  ALT  78<H>  /  AlkPhos  108  07-07          RADIOLOGY & ADDITIONAL STUDIES:

## 2017-07-08 LAB
ALBUMIN SERPL ELPH-MCNC: 3.3 G/DL — SIGNIFICANT CHANGE UP (ref 3.3–5)
ALP SERPL-CCNC: 195 U/L — HIGH (ref 40–120)
ALT FLD-CCNC: 106 U/L — HIGH (ref 10–45)
ANION GAP SERPL CALC-SCNC: 12 MMOL/L — SIGNIFICANT CHANGE UP (ref 5–17)
AST SERPL-CCNC: 86 U/L — HIGH (ref 10–40)
BILIRUB SERPL-MCNC: 5.5 MG/DL — HIGH (ref 0.2–1.2)
BUN SERPL-MCNC: 6 MG/DL — LOW (ref 7–23)
CALCIUM SERPL-MCNC: 8.4 MG/DL — SIGNIFICANT CHANGE UP (ref 8.4–10.5)
CHLORIDE SERPL-SCNC: 100 MMOL/L — SIGNIFICANT CHANGE UP (ref 96–108)
CO2 SERPL-SCNC: 26 MMOL/L — SIGNIFICANT CHANGE UP (ref 22–31)
CREAT SERPL-MCNC: 0.7 MG/DL — SIGNIFICANT CHANGE UP (ref 0.5–1.3)
GLUCOSE SERPL-MCNC: 167 MG/DL — HIGH (ref 70–99)
HCT VFR BLD CALC: 41.5 % — SIGNIFICANT CHANGE UP (ref 39–50)
HGB BLD-MCNC: 14 G/DL — SIGNIFICANT CHANGE UP (ref 13–17)
LIDOCAIN IGE QN: 250 U/L — HIGH (ref 7–60)
MAGNESIUM SERPL-MCNC: 1.6 MG/DL — SIGNIFICANT CHANGE UP (ref 1.6–2.6)
MCHC RBC-ENTMCNC: 30.8 PG — SIGNIFICANT CHANGE UP (ref 27–34)
MCHC RBC-ENTMCNC: 33.7 G/DL — SIGNIFICANT CHANGE UP (ref 32–36)
MCV RBC AUTO: 91.4 FL — SIGNIFICANT CHANGE UP (ref 80–100)
PHOSPHATE SERPL-MCNC: 2.8 MG/DL — SIGNIFICANT CHANGE UP (ref 2.5–4.5)
PLATELET # BLD AUTO: 134 K/UL — LOW (ref 150–400)
POTASSIUM SERPL-MCNC: 4 MMOL/L — SIGNIFICANT CHANGE UP (ref 3.5–5.3)
POTASSIUM SERPL-SCNC: 4 MMOL/L — SIGNIFICANT CHANGE UP (ref 3.5–5.3)
PROT SERPL-MCNC: 5.7 G/DL — LOW (ref 6–8.3)
RBC # BLD: 4.54 M/UL — SIGNIFICANT CHANGE UP (ref 4.2–5.8)
RBC # FLD: 12.8 % — SIGNIFICANT CHANGE UP (ref 10.3–16.9)
SODIUM SERPL-SCNC: 138 MMOL/L — SIGNIFICANT CHANGE UP (ref 135–145)
WBC # BLD: 8.4 K/UL — SIGNIFICANT CHANGE UP (ref 3.8–10.5)
WBC # FLD AUTO: 8.4 K/UL — SIGNIFICANT CHANGE UP (ref 3.8–10.5)

## 2017-07-08 PROCEDURE — 74181 MRI ABDOMEN W/O CONTRAST: CPT | Mod: 26

## 2017-07-08 RX ORDER — POTASSIUM PHOSPHATE, MONOBASIC POTASSIUM PHOSPHATE, DIBASIC 236; 224 MG/ML; MG/ML
15 INJECTION, SOLUTION INTRAVENOUS ONCE
Qty: 0 | Refills: 0 | Status: COMPLETED | OUTPATIENT
Start: 2017-07-08 | End: 2017-07-08

## 2017-07-08 RX ORDER — MAGNESIUM SULFATE 500 MG/ML
2 VIAL (ML) INJECTION EVERY 6 HOURS
Qty: 0 | Refills: 0 | Status: COMPLETED | OUTPATIENT
Start: 2017-07-08 | End: 2017-07-08

## 2017-07-08 RX ADMIN — PIPERACILLIN AND TAZOBACTAM 200 GRAM(S): 4; .5 INJECTION, POWDER, LYOPHILIZED, FOR SOLUTION INTRAVENOUS at 17:06

## 2017-07-08 RX ADMIN — HEPARIN SODIUM 5000 UNIT(S): 5000 INJECTION INTRAVENOUS; SUBCUTANEOUS at 06:31

## 2017-07-08 RX ADMIN — HEPARIN SODIUM 5000 UNIT(S): 5000 INJECTION INTRAVENOUS; SUBCUTANEOUS at 14:46

## 2017-07-08 RX ADMIN — TAMSULOSIN HYDROCHLORIDE 0.4 MILLIGRAM(S): 0.4 CAPSULE ORAL at 21:54

## 2017-07-08 RX ADMIN — PIPERACILLIN AND TAZOBACTAM 200 GRAM(S): 4; .5 INJECTION, POWDER, LYOPHILIZED, FOR SOLUTION INTRAVENOUS at 12:22

## 2017-07-08 RX ADMIN — HEPARIN SODIUM 5000 UNIT(S): 5000 INJECTION INTRAVENOUS; SUBCUTANEOUS at 22:00

## 2017-07-08 RX ADMIN — PIPERACILLIN AND TAZOBACTAM 200 GRAM(S): 4; .5 INJECTION, POWDER, LYOPHILIZED, FOR SOLUTION INTRAVENOUS at 06:31

## 2017-07-08 RX ADMIN — Medication 50 MILLIGRAM(S): at 12:23

## 2017-07-08 RX ADMIN — Medication 50 GRAM(S): at 17:06

## 2017-07-08 RX ADMIN — Medication 4: at 21:53

## 2017-07-08 RX ADMIN — Medication 50 MILLIGRAM(S): at 13:23

## 2017-07-08 RX ADMIN — PIPERACILLIN AND TAZOBACTAM 200 GRAM(S): 4; .5 INJECTION, POWDER, LYOPHILIZED, FOR SOLUTION INTRAVENOUS at 00:16

## 2017-07-08 RX ADMIN — Medication 2: at 07:41

## 2017-07-08 RX ADMIN — Medication 50 GRAM(S): at 12:23

## 2017-07-08 RX ADMIN — POTASSIUM PHOSPHATE, MONOBASIC POTASSIUM PHOSPHATE, DIBASIC 62.5 MILLIMOLE(S): 236; 224 INJECTION, SOLUTION INTRAVENOUS at 12:23

## 2017-07-08 RX ADMIN — SODIUM CHLORIDE 120 MILLILITER(S): 9 INJECTION, SOLUTION INTRAVENOUS at 12:24

## 2017-07-08 NOTE — PROGRESS NOTE ADULT - ASSESSMENT
66 year old male s/p cholecystectomy with rising T-bili, concern for retained biliary sludge. S/p ERCP with sphincterotomy and removal of biliary sludge. No evidence of retain CBD stones.  Pt clinically well, rising LFT's  and T-bili likely 2/2 to mainpulation of biliary tract via ercp. Continue to monitor LFTs     -Advance diet as tolerated  -Monitor LFTs  -Care per primary team

## 2017-07-08 NOTE — PROGRESS NOTE ADULT - SUBJECTIVE AND OBJECTIVE BOX
Pt seen and examined at bedside this am s/p ERCP with sphincterotomy. Denies abd pain n/v.    REVIEW OF SYSTEMS:  Constitutional: No fever, weight loss or fatigue  Cardiovascular: No chest pain, palpitations, dizziness or leg swelling  Gastrointestinal: No abdominal or epigastric pain. No nausea, vomiting or hematemesis; No diarrhea or constipation. No melena or hematochezia.  Skin: No itching, burning, rashes or lesions       MEDICATIONS:  MEDICATIONS  (STANDING):  heparin  Injectable 5000 Unit(s) SubCutaneous every 8 hours  lactated ringers. 1000 milliLiter(s) (120 mL/Hr) IV Continuous <Continuous>  insulin lispro (HumaLOG) corrective regimen sliding scale   SubCutaneous Before meals and at bedtime  dextrose 5%. 1000 milliLiter(s) (50 mL/Hr) IV Continuous <Continuous>  dextrose 50% Injectable 12.5 Gram(s) IV Push once  dextrose 50% Injectable 25 Gram(s) IV Push once  dextrose 50% Injectable 25 Gram(s) IV Push once  piperacillin/tazobactam IVPB. 3.375 Gram(s) IV Intermittent every 6 hours  tamsulosin 0.4 milliGRAM(s) Oral at bedtime  indomethacin Suppository 50 milliGRAM(s) Rectal daily  magnesium sulfate  IVPB 2 Gram(s) IV Intermittent every 6 hours  potassium phosphate IVPB 15 milliMole(s) IV Intermittent once    MEDICATIONS  (PRN):  ondansetron Injectable 4 milliGRAM(s) IV Push every 6 hours PRN Nausea  dextrose Gel 1 Dose(s) Oral once PRN Blood Glucose LESS THAN 70 milliGRAM(s)/deciliter  glucagon  Injectable 1 milliGRAM(s) IntraMuscular once PRN Glucose LESS THAN 70 milligrams/deciliter  oxyCODONE    5 mG/acetaminophen 325 mG 1 Tablet(s) Oral every 4 hours PRN Moderate Pain (4 - 6)  oxyCODONE    5 mG/acetaminophen 325 mG 2 Tablet(s) Oral every 4 hours PRN Severe Pain (7 - 10)  HYDROmorphone  Injectable 0.5 milliGRAM(s) IV Push once PRN breakthrough      Allergies    No Known Allergies    Intolerances        Vital Signs Last 24 Hrs  T(C): 36.4 (08 Jul 2017 09:00), Max: 37.4 (07 Jul 2017 21:05)  T(F): 97.5 (08 Jul 2017 09:00), Max: 99.4 (07 Jul 2017 21:05)  HR: 79 (08 Jul 2017 09:00) (73 - 109)  BP: 124/71 (08 Jul 2017 09:00) (113/71 - 137/79)  BP(mean): --  RR: 16 (08 Jul 2017 09:00) (16 - 19)  SpO2: 93% (08 Jul 2017 09:00) (93% - 98%)    07-07 @ 07:01  -  07-08 @ 07:00  --------------------------------------------------------  IN: 940 mL / OUT: 3100 mL / NET: -2160 mL        PHYSICAL EXAM:    General: Well developed; well nourished; in no acute distress  HEENT: MMM, conjunctiva and sclera clear  Gastrointestinal: Soft non-tender non-distended; Normal bowel sounds; No hepatosplenomegaly        CBC Full  -  ( 08 Jul 2017 07:08 )  WBC Count : 8.4 K/uL  Hemoglobin : 14.0 g/dL  Hematocrit : 41.5 %  Platelet Count - Automated : 134 K/uL  Mean Cell Volume : 91.4 fL  Mean Cell Hemoglobin : 30.8 pg  Mean Cell Hemoglobin Concentration : 33.7 g/dL  Auto Neutrophil # : x  Auto Lymphocyte # : x  Auto Monocyte # : x  Auto Eosinophil # : x  Auto Basophil # : x  Auto Neutrophil % : x  Auto Lymphocyte % : x  Auto Monocyte % : x  Auto Eosinophil % : x  Auto Basophil % : x    07-08    138  |  100  |  6<L>  ----------------------------<  167<H>  4.0   |  26  |  0.70    Ca    8.4      08 Jul 2017 07:08  Phos  2.8     07-08  Mg     1.6     07-08    TPro  5.7<L>  /  Alb  3.3  /  TBili  5.5<H>  /  DBili  x   /  AST  86<H>  /  ALT  106<H>  /  AlkPhos  195<H>  07-08                      RADIOLOGY & ADDITIONAL STUDIES (The following images were personally reviewed):

## 2017-07-08 NOTE — PROGRESS NOTE ADULT - ASSESSMENT
ABD SOFT, DISTENDED, BS+, FLATUS+, BM-, WOUNDS DRY INTACT.  TOTAL BILIRUBIN 5, POST ERCP WITH GI.  NPO, IVF, IV ABX, DVT PROFILAXIS.

## 2017-07-08 NOTE — PROGRESS NOTE ADULT - SUBJECTIVE AND OBJECTIVE BOX
O/N: ERCP done sludge was removed, NO stones in the CBD. they recommanded CLD, but pt still NPO. post ERCP t.bili 3.9. LFT elevated. alk phos 165, ALT/AST 91/88. WBC trending down. adequate UO  7/07: T. bili 2.1, ERCP with GI. NPO    Status post: lap bowen    POD # 2 STATUS POST:  POD 2 addi wiseman     SUBJECTIVE: Patient seen and examined bedside by chief resident. No issues overnight.    Vital Signs Last 24 Hrs  T(C): 36.9 (08 Jul 2017 20:25), Max: 36.9 (08 Jul 2017 20:25)  T(F): 98.5 (08 Jul 2017 20:25), Max: 98.5 (08 Jul 2017 20:25)  HR: 97 (08 Jul 2017 20:25) (73 - 97)  BP: 127/73 (08 Jul 2017 20:25) (113/71 - 128/74)  BP(mean): --  RR: 16 (08 Jul 2017 20:25) (16 - 17)  SpO2: 95% (08 Jul 2017 20:25) (93% - 98%)  I&O's Detail    07 Jul 2017 07:01  -  08 Jul 2017 07:00  --------------------------------------------------------  IN:    lactated ringers.: 840 mL    Solution: 100 mL  Total IN: 940 mL    OUT:    Indwelling Catheter - Urethral: 3100 mL  Total OUT: 3100 mL    Total NET: -2160 mL      08 Jul 2017 07:01  -  08 Jul 2017 23:33  --------------------------------------------------------  IN:    lactated ringers.: 1980 mL    Oral Fluid: 820 mL  Total IN: 2800 mL    OUT:    Indwelling Catheter - Urethral: 2800 mL  Total OUT: 2800 mL    Total NET: 0 mL          General: NAD, resting comfortably in bed  Pulm: Nonlabored breathing, no respiratory distress  Abd: appropriately tender, soft      LABS:                        14.0   8.4   )-----------( 134      ( 08 Jul 2017 07:08 )             41.5     07-08    138  |  100  |  6<L>  ----------------------------<  167<H>  4.0   |  26  |  0.70    Ca    8.4      08 Jul 2017 07:08  Phos  2.8     07-08  Mg     1.6     07-08    TPro  5.7<L>  /  Alb  3.3  /  TBili  5.5<H>  /  DBili  x   /  AST  86<H>  /  ALT  106<H>  /  AlkPhos  195<H>  07-08          RADIOLOGY & ADDITIONAL STUDIES:  O/N: ERCP done sludge was removed, NO stones in the CBD. they recommanded CLD, but pt still NPO. post ERCP t.bili 3.9. LFT elevated. alk phos 165, ALT/AST 91/88. WBC trending down. adequate UO  7/07: T. bili 2.1, ERCP with GI. NPO    Status post: lap bowen    POD # 2

## 2017-07-08 NOTE — PROGRESS NOTE ADULT - ASSESSMENT
65 y/o M s/p laprascopic cholecystectomy for chronic cholecystitis, found to be gangrenous     - Pain control. Nausea control.   - IVF.   - IS.   - NPO.   - I&Os.   - Zosyn.   - SSI.   - DVT prophylaxis.   - Guillermo to gravity 65 y/o M s/p laprascopic cholecystectomy for chronic cholecystitis, found to be gangrenous     -MRCP negative for CBD dilation. Elevated t bili.  - Pain control. Nausea control.   - IVF.   - IS.   - CLD  - I&Os.   - Zosyn.   - SSI.   - DVT prophylaxis.   - Guillermo to gravity

## 2017-07-08 NOTE — PROGRESS NOTE ADULT - SUBJECTIVE AND OBJECTIVE BOX
INTERVAL HPI/OVERNIGHT EVENTS:   SURGERY ATTENDING    STATUS POST:    LAPAROSCOPIC CHOLECYSTECTOMY, LYSIS OF  ADCHISIONS    POST OPERATIVE DAY #: 2    SUBJECTIVE:  Flatus: [X ] YES [ ] NO             Bowel Movement: [X ] YES [ ] NO  Pain (0-10):       2     Pain Control Adequate: [X ] YES [ ] NO  Nausea: [ ] YES [X ] NO            Vomiting: [ ] YES [X ] NO  Diarrhea: [ ] YES [X ] NO         Constipation: [ ] YES [X ] NO     Chest Pain: [ ] YES [X ] NO    SOB:  [ ] YES [X ] NO    MEDICATIONS  (STANDING):  heparin  Injectable 5000 Unit(s) SubCutaneous every 8 hours  lactated ringers. 1000 milliLiter(s) (120 mL/Hr) IV Continuous <Continuous>  insulin lispro (HumaLOG) corrective regimen sliding scale   SubCutaneous Before meals and at bedtime  dextrose 5%. 1000 milliLiter(s) (50 mL/Hr) IV Continuous <Continuous>  dextrose 50% Injectable 12.5 Gram(s) IV Push once  dextrose 50% Injectable 25 Gram(s) IV Push once  dextrose 50% Injectable 25 Gram(s) IV Push once  piperacillin/tazobactam IVPB. 3.375 Gram(s) IV Intermittent every 6 hours  tamsulosin 0.4 milliGRAM(s) Oral at bedtime  indomethacin Suppository 50 milliGRAM(s) Rectal daily  magnesium sulfate  IVPB 2 Gram(s) IV Intermittent every 6 hours    MEDICATIONS  (PRN):  ondansetron Injectable 4 milliGRAM(s) IV Push every 6 hours PRN Nausea  dextrose Gel 1 Dose(s) Oral once PRN Blood Glucose LESS THAN 70 milliGRAM(s)/deciliter  glucagon  Injectable 1 milliGRAM(s) IntraMuscular once PRN Glucose LESS THAN 70 milligrams/deciliter  oxyCODONE    5 mG/acetaminophen 325 mG 1 Tablet(s) Oral every 4 hours PRN Moderate Pain (4 - 6)  oxyCODONE    5 mG/acetaminophen 325 mG 2 Tablet(s) Oral every 4 hours PRN Severe Pain (7 - 10)  HYDROmorphone  Injectable 0.5 milliGRAM(s) IV Push once PRN breakthrough      Vital Signs Last 24 Hrs  T(C): 37.4 (08 Jul 2017 13:36), Max: 37.4 (07 Jul 2017 21:05)  T(F): 99.3 (08 Jul 2017 13:36), Max: 99.4 (07 Jul 2017 21:05)  HR: 92 (08 Jul 2017 13:36) (73 - 109)  BP: 103/66 (08 Jul 2017 13:36) (103/66 - 137/79)  BP(mean): --  RR: 18 (08 Jul 2017 13:36) (16 - 19)  SpO2: 94% (08 Jul 2017 13:36) (93% - 98%)    PHYSICAL EXAM:      Constitutional:    Eyes:    ENMT:    Neck:    Breasts:    Back:    Respiratory:    Cardiovascular:    Gastrointestinal:    Genitourinary:    Rectal:    Extremities:    Vascular:    Neurological:    Skin:    Lymph Nodes:    Musculoskeletal:    Psychiatric:        I&O's Detail    07 Jul 2017 07:01  -  08 Jul 2017 07:00  --------------------------------------------------------  IN:    lactated ringers.: 840 mL    Solution: 100 mL  Total IN: 940 mL    OUT:    Indwelling Catheter - Urethral: 3100 mL  Total OUT: 3100 mL    Total NET: -2160 mL      08 Jul 2017 07:01  -  08 Jul 2017 13:39  --------------------------------------------------------  IN:    Oral Fluid: 240 mL  Total IN: 240 mL    OUT:  Total OUT: 0 mL    Total NET: 240 mL          LABS:                        14.0   8.4   )-----------( 134      ( 08 Jul 2017 07:08 )             41.5     07-08    138  |  100  |  6<L>  ----------------------------<  167<H>  4.0   |  26  |  0.70    Ca    8.4      08 Jul 2017 07:08  Phos  2.8     07-08  Mg     1.6     07-08    TPro  5.7<L>  /  Alb  3.3  /  TBili  5.5<H>  /  DBili  x   /  AST  86<H>  /  ALT  106<H>  /  AlkPhos  195<H>  07-08          RADIOLOGY & ADDITIONAL STUDIES:

## 2017-07-09 LAB
ALBUMIN SERPL ELPH-MCNC: 3.7 G/DL — SIGNIFICANT CHANGE UP (ref 3.3–5)
ALP SERPL-CCNC: 262 U/L — HIGH (ref 40–120)
ALT FLD-CCNC: 107 U/L — HIGH (ref 10–45)
ANION GAP SERPL CALC-SCNC: 11 MMOL/L — SIGNIFICANT CHANGE UP (ref 5–17)
AST SERPL-CCNC: 68 U/L — HIGH (ref 10–40)
BILIRUB DIRECT SERPL-MCNC: 1.9 MG/DL — HIGH (ref 0–0.2)
BILIRUB DIRECT SERPL-MCNC: 1.9 MG/DL — HIGH (ref 0–0.2)
BILIRUB INDIRECT FLD-MCNC: 1.3 MG/DL — HIGH (ref 0.2–1)
BILIRUB SERPL-MCNC: 3.2 MG/DL — HIGH (ref 0.2–1.2)
BUN SERPL-MCNC: 6 MG/DL — LOW (ref 7–23)
CALCIUM SERPL-MCNC: 9 MG/DL — SIGNIFICANT CHANGE UP (ref 8.4–10.5)
CHLORIDE SERPL-SCNC: 100 MMOL/L — SIGNIFICANT CHANGE UP (ref 96–108)
CO2 SERPL-SCNC: 28 MMOL/L — SIGNIFICANT CHANGE UP (ref 22–31)
CREAT SERPL-MCNC: 0.8 MG/DL — SIGNIFICANT CHANGE UP (ref 0.5–1.3)
CULTURE RESULTS: NO GROWTH — SIGNIFICANT CHANGE UP
GLUCOSE SERPL-MCNC: 146 MG/DL — HIGH (ref 70–99)
HCT VFR BLD CALC: 44.9 % — SIGNIFICANT CHANGE UP (ref 39–50)
HGB BLD-MCNC: 15 G/DL — SIGNIFICANT CHANGE UP (ref 13–17)
MAGNESIUM SERPL-MCNC: 1.6 MG/DL — SIGNIFICANT CHANGE UP (ref 1.6–2.6)
MCHC RBC-ENTMCNC: 30.6 PG — SIGNIFICANT CHANGE UP (ref 27–34)
MCHC RBC-ENTMCNC: 33.4 G/DL — SIGNIFICANT CHANGE UP (ref 32–36)
MCV RBC AUTO: 91.6 FL — SIGNIFICANT CHANGE UP (ref 80–100)
PHOSPHATE SERPL-MCNC: 2.5 MG/DL — SIGNIFICANT CHANGE UP (ref 2.5–4.5)
PLATELET # BLD AUTO: 150 K/UL — SIGNIFICANT CHANGE UP (ref 150–400)
POTASSIUM SERPL-MCNC: 4.2 MMOL/L — SIGNIFICANT CHANGE UP (ref 3.5–5.3)
POTASSIUM SERPL-SCNC: 4.2 MMOL/L — SIGNIFICANT CHANGE UP (ref 3.5–5.3)
PROT SERPL-MCNC: 6.7 G/DL — SIGNIFICANT CHANGE UP (ref 6–8.3)
RBC # BLD: 4.9 M/UL — SIGNIFICANT CHANGE UP (ref 4.2–5.8)
RBC # FLD: 13.2 % — SIGNIFICANT CHANGE UP (ref 10.3–16.9)
SODIUM SERPL-SCNC: 139 MMOL/L — SIGNIFICANT CHANGE UP (ref 135–145)
SPECIMEN SOURCE: SIGNIFICANT CHANGE UP
WBC # BLD: 12.5 K/UL — HIGH (ref 3.8–10.5)
WBC # FLD AUTO: 12.5 K/UL — HIGH (ref 3.8–10.5)

## 2017-07-09 RX ORDER — MAGNESIUM SULFATE 500 MG/ML
1 VIAL (ML) INJECTION ONCE
Qty: 0 | Refills: 0 | Status: COMPLETED | OUTPATIENT
Start: 2017-07-09 | End: 2017-07-09

## 2017-07-09 RX ORDER — SODIUM,POTASSIUM PHOSPHATES 278-250MG
1 POWDER IN PACKET (EA) ORAL
Qty: 0 | Refills: 0 | Status: COMPLETED | OUTPATIENT
Start: 2017-07-09 | End: 2017-07-10

## 2017-07-09 RX ORDER — SODIUM CHLORIDE 9 MG/ML
1000 INJECTION, SOLUTION INTRAVENOUS
Qty: 0 | Refills: 0 | Status: DISCONTINUED | OUTPATIENT
Start: 2017-07-09 | End: 2017-07-10

## 2017-07-09 RX ADMIN — Medication 1 TABLET(S): at 12:22

## 2017-07-09 RX ADMIN — PIPERACILLIN AND TAZOBACTAM 200 GRAM(S): 4; .5 INJECTION, POWDER, LYOPHILIZED, FOR SOLUTION INTRAVENOUS at 18:01

## 2017-07-09 RX ADMIN — OXYCODONE AND ACETAMINOPHEN 2 TABLET(S): 5; 325 TABLET ORAL at 00:41

## 2017-07-09 RX ADMIN — PIPERACILLIN AND TAZOBACTAM 200 GRAM(S): 4; .5 INJECTION, POWDER, LYOPHILIZED, FOR SOLUTION INTRAVENOUS at 12:19

## 2017-07-09 RX ADMIN — OXYCODONE AND ACETAMINOPHEN 2 TABLET(S): 5; 325 TABLET ORAL at 01:41

## 2017-07-09 RX ADMIN — PIPERACILLIN AND TAZOBACTAM 200 GRAM(S): 4; .5 INJECTION, POWDER, LYOPHILIZED, FOR SOLUTION INTRAVENOUS at 00:00

## 2017-07-09 RX ADMIN — Medication 1 TABLET(S): at 21:27

## 2017-07-09 RX ADMIN — Medication 50 MILLIGRAM(S): at 12:19

## 2017-07-09 RX ADMIN — Medication 1 TABLET(S): at 18:01

## 2017-07-09 RX ADMIN — SODIUM CHLORIDE 125 MILLILITER(S): 9 INJECTION, SOLUTION INTRAVENOUS at 12:22

## 2017-07-09 RX ADMIN — OXYCODONE AND ACETAMINOPHEN 2 TABLET(S): 5; 325 TABLET ORAL at 10:54

## 2017-07-09 RX ADMIN — Medication 50 MILLIGRAM(S): at 13:00

## 2017-07-09 RX ADMIN — PIPERACILLIN AND TAZOBACTAM 200 GRAM(S): 4; .5 INJECTION, POWDER, LYOPHILIZED, FOR SOLUTION INTRAVENOUS at 06:00

## 2017-07-09 RX ADMIN — Medication 100 GRAM(S): at 10:54

## 2017-07-09 RX ADMIN — HEPARIN SODIUM 5000 UNIT(S): 5000 INJECTION INTRAVENOUS; SUBCUTANEOUS at 22:00

## 2017-07-09 RX ADMIN — Medication 2: at 21:27

## 2017-07-09 RX ADMIN — HEPARIN SODIUM 5000 UNIT(S): 5000 INJECTION INTRAVENOUS; SUBCUTANEOUS at 14:20

## 2017-07-09 RX ADMIN — OXYCODONE AND ACETAMINOPHEN 2 TABLET(S): 5; 325 TABLET ORAL at 05:50

## 2017-07-09 RX ADMIN — TAMSULOSIN HYDROCHLORIDE 0.4 MILLIGRAM(S): 0.4 CAPSULE ORAL at 21:27

## 2017-07-09 RX ADMIN — Medication 4: at 12:17

## 2017-07-09 RX ADMIN — OXYCODONE AND ACETAMINOPHEN 2 TABLET(S): 5; 325 TABLET ORAL at 20:32

## 2017-07-09 RX ADMIN — OXYCODONE AND ACETAMINOPHEN 2 TABLET(S): 5; 325 TABLET ORAL at 06:50

## 2017-07-09 RX ADMIN — OXYCODONE AND ACETAMINOPHEN 2 TABLET(S): 5; 325 TABLET ORAL at 21:32

## 2017-07-09 RX ADMIN — HEPARIN SODIUM 5000 UNIT(S): 5000 INJECTION INTRAVENOUS; SUBCUTANEOUS at 06:00

## 2017-07-09 RX ADMIN — OXYCODONE AND ACETAMINOPHEN 2 TABLET(S): 5; 325 TABLET ORAL at 11:50

## 2017-07-09 NOTE — PROGRESS NOTE ADULT - ASSESSMENT
ABD SOFT, DISTENDED, BS+, FLATUS+, BM+, WOUNDS DRY INTACT.  TOTAL BILIRUBIN 3.3 DOWN FROM 5, TOLERATING CLEAR LIQUID DIET  ADVANCE TO FULL LIQUID DIET , IVF, IV ABX, DVT PROFILAXIS.

## 2017-07-09 NOTE — PROGRESS NOTE ADULT - SUBJECTIVE AND OBJECTIVE BOX
INTERVAL HPI/OVERNIGHT EVENTS:   SURGERY ATTENDING    STATUS POST:  LAPAROSCOPIC CHOLECYSTECTOMY, LYSIS OF  ADHESIONS, FOR GANGRENOUS GALLBLADDER, S/P ERCP     POST OPERATIVE DAY #: 3    SUBJECTIVE:  Flatus: [X ] YES [ ] NO             Bowel Movement: [X ] YES [ ] NO  Pain (0-10):      3      Pain Control Adequate: [ ] YES [ ] NO  Nausea: [ ] YES [X ] NO            Vomiting: [ ] YES [X ] NO  Diarrhea: [ ] YES [X ] NO         Constipation: [ ] YES [X ] NO     Chest Pain: [ ] YES [X ] NO    SOB:  [ ] YES [X ] NO    MEDICATIONS  (STANDING):  heparin  Injectable 5000 Unit(s) SubCutaneous every 8 hours  insulin lispro (HumaLOG) corrective regimen sliding scale   SubCutaneous Before meals and at bedtime  dextrose 5%. 1000 milliLiter(s) (50 mL/Hr) IV Continuous <Continuous>  dextrose 50% Injectable 12.5 Gram(s) IV Push once  dextrose 50% Injectable 25 Gram(s) IV Push once  dextrose 50% Injectable 25 Gram(s) IV Push once  piperacillin/tazobactam IVPB. 3.375 Gram(s) IV Intermittent every 6 hours  tamsulosin 0.4 milliGRAM(s) Oral at bedtime  potassium acid phosphate/sodium acid phosphate tablet (K-PHOS No. 2) 1 Tablet(s) Oral four times a day with meals  lactated ringers. 1000 milliLiter(s) (125 mL/Hr) IV Continuous <Continuous>    MEDICATIONS  (PRN):  ondansetron Injectable 4 milliGRAM(s) IV Push every 6 hours PRN Nausea  dextrose Gel 1 Dose(s) Oral once PRN Blood Glucose LESS THAN 70 milliGRAM(s)/deciliter  glucagon  Injectable 1 milliGRAM(s) IntraMuscular once PRN Glucose LESS THAN 70 milligrams/deciliter  oxyCODONE    5 mG/acetaminophen 325 mG 1 Tablet(s) Oral every 4 hours PRN Moderate Pain (4 - 6)  oxyCODONE    5 mG/acetaminophen 325 mG 2 Tablet(s) Oral every 4 hours PRN Severe Pain (7 - 10)  HYDROmorphone  Injectable 0.5 milliGRAM(s) IV Push once PRN breakthrough      Vital Signs Last 24 Hrs  T(C): 36.9 (09 Jul 2017 16:27), Max: 37.3 (09 Jul 2017 13:17)  T(F): 98.5 (09 Jul 2017 16:27), Max: 99.2 (09 Jul 2017 13:17)  HR: 93 (09 Jul 2017 16:27) (93 - 100)  BP: 149/80 (09 Jul 2017 16:27) (113/71 - 149/80)  BP(mean): --  RR: 18 (09 Jul 2017 16:27) (16 - 106)  SpO2: 95% (09 Jul 2017 16:27) (93% - 95%)    PHYSICAL EXAM:      Constitutional:    Eyes:    ENMT:    Neck:    Breasts:    Back:    Respiratory:    Cardiovascular:    Gastrointestinal:    Genitourinary:    Rectal:    Extremities:    Vascular:    Neurological:    Skin:    Lymph Nodes:    Musculoskeletal:    Psychiatric:        I&O's Detail    08 Jul 2017 07:01  -  09 Jul 2017 07:00  --------------------------------------------------------  IN:    lactated ringers.: 2760 mL    Oral Fluid: 820 mL    Solution: 200 mL  Total IN: 3780 mL    OUT:    Indwelling Catheter - Urethral: 4375 mL  Total OUT: 4375 mL    Total NET: -595 mL      09 Jul 2017 07:01  -  09 Jul 2017 18:30  --------------------------------------------------------  IN:    lactated ringers.: 240 mL    lactated ringers.: 876 mL    Oral Fluid: 600 mL    Solution: 200 mL    Solution: 100 mL  Total IN: 2016 mL    OUT:    Indwelling Catheter - Urethral: 1280 mL  Total OUT: 1280 mL    Total NET: 736 mL          LABS:                        15.0   12.5  )-----------( 150      ( 09 Jul 2017 07:06 )             44.9     07-09    139  |  100  |  6<L>  ----------------------------<  146<H>  4.2   |  28  |  0.80    Ca    9.0      09 Jul 2017 07:06  Phos  2.5     07-09  Mg     1.6     07-09    TPro  6.7  /  Alb  3.7  /  TBili  3.2<H>  /  DBili  1.9<H>  /  AST  68<H>  /  ALT  107<H>  /  AlkPhos  262<H>  07-09          RADIOLOGY & ADDITIONAL STUDIES:

## 2017-07-09 NOTE — PROGRESS NOTE ADULT - ASSESSMENT
66 year old male s/p cholecystectomy with rising T-bili, concern for retained biliary sludge. S/p ERCP with sphincterotomy and removal of biliary sludge. No evidence of retain CBD stones.  Repeat MRCP negative for choledocolithiasis. T-bili downtrending.    -Advance diet as tolerated  -MRCP appreciated.   -Monitor LFTs  -Care per primary team

## 2017-07-09 NOTE — PROGRESS NOTE ADULT - SUBJECTIVE AND OBJECTIVE BOX
Pt seen and examined at bedside this am. No acute overnight events. Pt afebrile. Denies abd pain n/v     REVIEW OF SYSTEMS:  Constitutional: No fever, weight loss or fatigue  Cardiovascular: No chest pain, palpitations, dizziness or leg swelling  Gastrointestinal: No abdominal or epigastric pain. No nausea, vomiting or hematemesis; No diarrhea or constipation. No melena or hematochezia.  Skin: No itching, burning, rashes or lesions       MEDICATIONS:  MEDICATIONS  (STANDING):  heparin  Injectable 5000 Unit(s) SubCutaneous every 8 hours  insulin lispro (HumaLOG) corrective regimen sliding scale   SubCutaneous Before meals and at bedtime  dextrose 5%. 1000 milliLiter(s) (50 mL/Hr) IV Continuous <Continuous>  dextrose 50% Injectable 12.5 Gram(s) IV Push once  dextrose 50% Injectable 25 Gram(s) IV Push once  dextrose 50% Injectable 25 Gram(s) IV Push once  piperacillin/tazobactam IVPB. 3.375 Gram(s) IV Intermittent every 6 hours  tamsulosin 0.4 milliGRAM(s) Oral at bedtime  indomethacin Suppository 50 milliGRAM(s) Rectal daily  magnesium sulfate  IVPB 1 Gram(s) IV Intermittent once  potassium acid phosphate/sodium acid phosphate tablet (K-PHOS No. 2) 1 Tablet(s) Oral four times a day with meals    MEDICATIONS  (PRN):  ondansetron Injectable 4 milliGRAM(s) IV Push every 6 hours PRN Nausea  dextrose Gel 1 Dose(s) Oral once PRN Blood Glucose LESS THAN 70 milliGRAM(s)/deciliter  glucagon  Injectable 1 milliGRAM(s) IntraMuscular once PRN Glucose LESS THAN 70 milligrams/deciliter  oxyCODONE    5 mG/acetaminophen 325 mG 1 Tablet(s) Oral every 4 hours PRN Moderate Pain (4 - 6)  oxyCODONE    5 mG/acetaminophen 325 mG 2 Tablet(s) Oral every 4 hours PRN Severe Pain (7 - 10)  HYDROmorphone  Injectable 0.5 milliGRAM(s) IV Push once PRN breakthrough      Allergies    No Known Allergies    Intolerances        Vital Signs Last 24 Hrs  T(C): 36.4 (09 Jul 2017 08:18), Max: 36.9 (08 Jul 2017 20:25)  T(F): 97.5 (09 Jul 2017 08:18), Max: 98.5 (08 Jul 2017 20:25)  HR: 100 (09 Jul 2017 08:18) (80 - 100)  BP: 138/- (09 Jul 2017 08:18) (113/71 - 138/-)  BP(mean): --  RR: 16 (09 Jul 2017 08:18) (16 - 17)  SpO2: 93% (09 Jul 2017 08:18) (93% - 98%)    07-08 @ 07:01  -  07-09 @ 07:00  --------------------------------------------------------  IN: 3780 mL / OUT: 4375 mL / NET: -595 mL    07-09 @ 07:01  -  07-09 @ 10:50  --------------------------------------------------------  IN: 360 mL / OUT: 0 mL / NET: 360 mL        PHYSICAL EXAM:    General: Well developed; well nourished; in no acute distress  HEENT: MMM, conjunctiva and sclera clear  Gastrointestinal: Soft non-tender non-distended; Normal bowel sounds;      LABS:      CBC Full  -  ( 09 Jul 2017 07:06 )  WBC Count : 12.5 K/uL  Hemoglobin : 15.0 g/dL  Hematocrit : 44.9 %  Platelet Count - Automated : 150 K/uL  Mean Cell Volume : 91.6 fL  Mean Cell Hemoglobin : 30.6 pg  Mean Cell Hemoglobin Concentration : 33.4 g/dL  Auto Neutrophil # : x  Auto Lymphocyte # : x  Auto Monocyte # : x  Auto Eosinophil # : x  Auto Basophil # : x  Auto Neutrophil % : x  Auto Lymphocyte % : x  Auto Monocyte % : x  Auto Eosinophil % : x  Auto Basophil % : x    07-09    139  |  100  |  6<L>  ----------------------------<  146<H>  4.2   |  28  |  0.80    Ca    9.0      09 Jul 2017 07:06  Phos  2.5     07-09  Mg     1.6     07-09    TPro  6.7  /  Alb  3.7  /  TBili  3.2<H>  /  DBili  1.9<H>  /  AST  68<H>  /  ALT  107<H>  /  AlkPhos  262<H>  07-09                      RADIOLOGY & ADDITIONAL STUDIES (The following images were personally reviewed):

## 2017-07-09 NOTE — PROGRESS NOTE ADULT - SUBJECTIVE AND OBJECTIVE BOX
O/N: VSS, ALEX, tolerating CLD  7/8: T. bili 5.5. MRCP wnl.    STATUS POST:  laparoscopic cholecystectomy     POST OPERATIVE DAY #: 3      Patient seen and examined, +Flatus, pain controlled with pain medication, OOBA, denies nausea, vomiting.    piperacillin/tazobactam IVPB. 3.375 Gram(s) IV Intermittent every 6 hours  tamsulosin 0.4 milliGRAM(s) Oral at bedtime      Vital Signs Last 24 Hrs  T(C): 36.8 (09 Jul 2017 05:45), Max: 36.9 (08 Jul 2017 20:25)  T(F): 98.3 (09 Jul 2017 05:45), Max: 98.5 (08 Jul 2017 20:25)  HR: 93 (09 Jul 2017 05:45) (79 - 97)  BP: 113/71 (09 Jul 2017 05:45) (113/71 - 128/74)  BP(mean): --  RR: 17 (09 Jul 2017 05:45) (16 - 17)  SpO2: 95% (09 Jul 2017 05:45) (93% - 98%)    I&O's Detail    08 Jul 2017 07:01  -  09 Jul 2017 07:00  --------------------------------------------------------  IN:    lactated ringers.: 2760 mL    Oral Fluid: 820 mL    Solution: 200 mL  Total IN: 3780 mL    OUT:    Indwelling Catheter - Urethral: 4375 mL  Total OUT: 4375 mL    Total NET: -595 mL      09 Jul 2017 07:01  -  09 Jul 2017 07:48  --------------------------------------------------------  IN:    lactated ringers.: 120 mL  Total IN: 120 mL    OUT:  Total OUT: 0 mL    Total NET: 120 mL        PHYSICAL EXAM:      Constitutional: NAD,     Gastrointestinal: abd softly distended, NT    Genitourinary: Guillermo in place, adequate UOP

## 2017-07-10 ENCOUNTER — TRANSCRIPTION ENCOUNTER (OUTPATIENT)
Age: 67
End: 2017-07-10

## 2017-07-10 VITALS
OXYGEN SATURATION: 95 % | TEMPERATURE: 99 F | SYSTOLIC BLOOD PRESSURE: 124 MMHG | HEART RATE: 108 BPM | DIASTOLIC BLOOD PRESSURE: 76 MMHG | RESPIRATION RATE: 17 BRPM

## 2017-07-10 LAB
ALBUMIN SERPL ELPH-MCNC: 3.1 G/DL — LOW (ref 3.3–5)
ALP SERPL-CCNC: 216 U/L — HIGH (ref 40–120)
ALT FLD-CCNC: 66 U/L — HIGH (ref 10–45)
ANION GAP SERPL CALC-SCNC: 12 MMOL/L — SIGNIFICANT CHANGE UP (ref 5–17)
AST SERPL-CCNC: 25 U/L — SIGNIFICANT CHANGE UP (ref 10–40)
BILIRUB SERPL-MCNC: 2.3 MG/DL — HIGH (ref 0.2–1.2)
BUN SERPL-MCNC: 4 MG/DL — LOW (ref 7–23)
CALCIUM SERPL-MCNC: 8.6 MG/DL — SIGNIFICANT CHANGE UP (ref 8.4–10.5)
CHLORIDE SERPL-SCNC: 100 MMOL/L — SIGNIFICANT CHANGE UP (ref 96–108)
CO2 SERPL-SCNC: 27 MMOL/L — SIGNIFICANT CHANGE UP (ref 22–31)
CREAT SERPL-MCNC: 0.7 MG/DL — SIGNIFICANT CHANGE UP (ref 0.5–1.3)
GLUCOSE SERPL-MCNC: 149 MG/DL — HIGH (ref 70–99)
HCT VFR BLD CALC: 42 % — SIGNIFICANT CHANGE UP (ref 39–50)
HGB BLD-MCNC: 13.9 G/DL — SIGNIFICANT CHANGE UP (ref 13–17)
MAGNESIUM SERPL-MCNC: 1.3 MG/DL — LOW (ref 1.6–2.6)
MCHC RBC-ENTMCNC: 30.5 PG — SIGNIFICANT CHANGE UP (ref 27–34)
MCHC RBC-ENTMCNC: 33.1 G/DL — SIGNIFICANT CHANGE UP (ref 32–36)
MCV RBC AUTO: 92.3 FL — SIGNIFICANT CHANGE UP (ref 80–100)
PHOSPHATE SERPL-MCNC: 2.3 MG/DL — LOW (ref 2.5–4.5)
PLATELET # BLD AUTO: 128 K/UL — LOW (ref 150–400)
POTASSIUM SERPL-MCNC: 3.9 MMOL/L — SIGNIFICANT CHANGE UP (ref 3.5–5.3)
POTASSIUM SERPL-SCNC: 3.9 MMOL/L — SIGNIFICANT CHANGE UP (ref 3.5–5.3)
PROT SERPL-MCNC: 6.1 G/DL — SIGNIFICANT CHANGE UP (ref 6–8.3)
RBC # BLD: 4.55 M/UL — SIGNIFICANT CHANGE UP (ref 4.2–5.8)
RBC # FLD: 13 % — SIGNIFICANT CHANGE UP (ref 10.3–16.9)
SODIUM SERPL-SCNC: 139 MMOL/L — SIGNIFICANT CHANGE UP (ref 135–145)
WBC # BLD: 10.9 K/UL — HIGH (ref 3.8–10.5)
WBC # FLD AUTO: 10.9 K/UL — HIGH (ref 3.8–10.5)

## 2017-07-10 PROCEDURE — 87075 CULTR BACTERIA EXCEPT BLOOD: CPT

## 2017-07-10 PROCEDURE — 80053 COMPREHEN METABOLIC PANEL: CPT

## 2017-07-10 PROCEDURE — 83036 HEMOGLOBIN GLYCOSYLATED A1C: CPT

## 2017-07-10 PROCEDURE — 85027 COMPLETE CBC AUTOMATED: CPT

## 2017-07-10 PROCEDURE — 87070 CULTURE OTHR SPECIMN AEROBIC: CPT

## 2017-07-10 PROCEDURE — 83690 ASSAY OF LIPASE: CPT

## 2017-07-10 PROCEDURE — 80076 HEPATIC FUNCTION PANEL: CPT

## 2017-07-10 PROCEDURE — 82248 BILIRUBIN DIRECT: CPT

## 2017-07-10 PROCEDURE — 85025 COMPLETE CBC W/AUTO DIFF WBC: CPT

## 2017-07-10 PROCEDURE — 84100 ASSAY OF PHOSPHORUS: CPT

## 2017-07-10 PROCEDURE — 74328 X-RAY BILE DUCT ENDOSCOPY: CPT

## 2017-07-10 PROCEDURE — C1769: CPT

## 2017-07-10 PROCEDURE — 88304 TISSUE EXAM BY PATHOLOGIST: CPT

## 2017-07-10 PROCEDURE — 83735 ASSAY OF MAGNESIUM: CPT

## 2017-07-10 PROCEDURE — 74181 MRI ABDOMEN W/O CONTRAST: CPT

## 2017-07-10 PROCEDURE — 80048 BASIC METABOLIC PNL TOTAL CA: CPT

## 2017-07-10 PROCEDURE — 36415 COLL VENOUS BLD VENIPUNCTURE: CPT

## 2017-07-10 RX ORDER — POTASSIUM PHOSPHATE, MONOBASIC POTASSIUM PHOSPHATE, DIBASIC 236; 224 MG/ML; MG/ML
15 INJECTION, SOLUTION INTRAVENOUS ONCE
Qty: 0 | Refills: 0 | Status: COMPLETED | OUTPATIENT
Start: 2017-07-10 | End: 2017-07-10

## 2017-07-10 RX ADMIN — SODIUM CHLORIDE 125 MILLILITER(S): 9 INJECTION, SOLUTION INTRAVENOUS at 05:10

## 2017-07-10 RX ADMIN — OXYCODONE AND ACETAMINOPHEN 2 TABLET(S): 5; 325 TABLET ORAL at 06:11

## 2017-07-10 RX ADMIN — POTASSIUM PHOSPHATE, MONOBASIC POTASSIUM PHOSPHATE, DIBASIC 62.5 MILLIMOLE(S): 236; 224 INJECTION, SOLUTION INTRAVENOUS at 11:52

## 2017-07-10 RX ADMIN — OXYCODONE AND ACETAMINOPHEN 2 TABLET(S): 5; 325 TABLET ORAL at 05:11

## 2017-07-10 RX ADMIN — PIPERACILLIN AND TAZOBACTAM 200 GRAM(S): 4; .5 INJECTION, POWDER, LYOPHILIZED, FOR SOLUTION INTRAVENOUS at 12:11

## 2017-07-10 RX ADMIN — Medication 4: at 12:12

## 2017-07-10 RX ADMIN — SODIUM CHLORIDE 125 MILLILITER(S): 9 INJECTION, SOLUTION INTRAVENOUS at 14:10

## 2017-07-10 RX ADMIN — PIPERACILLIN AND TAZOBACTAM 200 GRAM(S): 4; .5 INJECTION, POWDER, LYOPHILIZED, FOR SOLUTION INTRAVENOUS at 17:22

## 2017-07-10 RX ADMIN — Medication 1 TABLET(S): at 06:55

## 2017-07-10 RX ADMIN — HEPARIN SODIUM 5000 UNIT(S): 5000 INJECTION INTRAVENOUS; SUBCUTANEOUS at 14:09

## 2017-07-10 RX ADMIN — HEPARIN SODIUM 5000 UNIT(S): 5000 INJECTION INTRAVENOUS; SUBCUTANEOUS at 06:00

## 2017-07-10 RX ADMIN — PIPERACILLIN AND TAZOBACTAM 200 GRAM(S): 4; .5 INJECTION, POWDER, LYOPHILIZED, FOR SOLUTION INTRAVENOUS at 00:00

## 2017-07-10 RX ADMIN — PIPERACILLIN AND TAZOBACTAM 200 GRAM(S): 4; .5 INJECTION, POWDER, LYOPHILIZED, FOR SOLUTION INTRAVENOUS at 06:00

## 2017-07-10 NOTE — DISCHARGE NOTE ADULT - PATIENT PORTAL LINK FT
“You can access the [employer] Patient Portal, offered by [facility], by registering with the following website: [email or web address]”

## 2017-07-10 NOTE — DISCHARGE NOTE ADULT - CARE PLAN
Principal Discharge DX:	Cholecystitis, chronic  Goal:	Recovery  Instructions for follow-up, activity and diet:	Please follow up with Dr. Chicas in clinic in one to two weeks. Please call [phone] to schedule your appointment.   You may take Tylenol for pain. Principal Discharge DX:	Cholecystitis, chronic  Goal:	Recovery  Instructions for follow-up, activity and diet:	Please follow up with Dr. Chicas in clinic in one to two weeks. Please call [phone] to schedule your appointment.   You may take Tylenol or Advil for pain every 6 hours. If your pain is severe, 7-10, on a 1-10 scale you may take Vicodin as directed. Principal Discharge DX:	Cholecystitis, chronic  Goal:	Recovery  Instructions for follow-up, activity and diet:	Please follow up with Dr. Chicas in clinic in one to two weeks. Please call [phone] to schedule your appointment.   You may take Tylenol or Advil for pain every 6 hours. If your pain is severe, 7-10, on a 1-10 scale you may take Vicodin as directed.

## 2017-07-10 NOTE — PROGRESS NOTE ADULT - SUBJECTIVE AND OBJECTIVE BOX
O/N: VSS, ALEX  7/9: +flatus, advanced to FLD tolerating. Guillermo to remain until tomorrow, Tbili trending down 3.2    Status post: lap bowen    POD # 4 O/N: ALEX LITTLE  7/9: +flatus, advanced to FLD tolerating. Hernandez to remain until tomorrow, Tbili trending down 3.2    Status post: lap bowen    POD # 4      Patient seen and examined bedside with chief resident, +KIRSTIN SHEPARD, denies abd pain, nausea, vomiting.    piperacillin/tazobactam IVPB. 3.375 Gram(s) IV Intermittent every 6 hours  tamsulosin 0.4 milliGRAM(s) Oral at bedtime      Vital Signs Last 24 Hrs  T(C): 37.1 (10 Jul 2017 05:31), Max: 37.4 (09 Jul 2017 20:02)  T(F): 98.7 (10 Jul 2017 05:31), Max: 99.4 (09 Jul 2017 20:02)  HR: 85 (10 Jul 2017 05:31) (77 - 100)  BP: 138/81 (10 Jul 2017 05:31) (125/74 - 149/80)  BP(mean): --  RR: 17 (10 Jul 2017 05:31) (16 - 18)  SpO2: 91% (10 Jul 2017 05:31) (91% - 96%)    I&O's Detail    09 Jul 2017 07:01  -  10 Jul 2017 07:00  --------------------------------------------------------  IN:    lactated ringers.: 240 mL    lactated ringers.: 2250 mL    Oral Fluid: 920 mL    Solution: 400 mL    Solution: 100 mL  Total IN: 3910 mL    OUT:    Indwelling Catheter - Urethral: 5655 mL  Total OUT: 5655 mL    Total NET: -1745 mL        PHYSICAL EXAM:      Constitutional: NAD    Gastrointestinal: abd soft, ND, NT. Incisions CDI     Genitourinary: Good UOP, hernandez in place

## 2017-07-10 NOTE — DISCHARGE NOTE ADULT - MEDICATION SUMMARY - MEDICATIONS TO TAKE
I will START or STAY ON the medications listed below when I get home from the hospital:    lisinopril 10 mg oral tablet  -- 1 tab(s) by mouth once a day  -- Indication: For Home Meds    Janumet 50 mg-1000 mg oral tablet  -- 1 tab(s) by mouth 2 times a day  -- Indication: For Home Meds    amLODIPine 10 mg oral tablet  -- 1 tab(s) by mouth once a day  -- Indication: For Home Meds    Augmentin 875 mg-125 mg oral tablet  -- 1 tab(s) by mouth every 12 hours  -- Finish all this medication unless otherwise directed by prescriber.  Take with food or milk.    -- Indication: For Post-operative course I will START or STAY ON the medications listed below when I get home from the hospital:    Vicodin 300 mg-5 mg oral tablet  -- 1 tab(s) by mouth every 6 hours, As Needed -for severe pain MDD:4  -- Caution federal law prohibits the transfer of this drug to any person other  than the person for whom it was prescribed.  Do not drink alcoholic beverages when taking this medication.  May cause drowsiness.  Alcohol may intensify this effect.  Use care when operating dangerous machinery.  This drug may impair the ability to drive or operate machinery.  Use care until you become familiar with its effects.  This product contains acetaminophen.  Do not use  with any other product containing acetaminophen to prevent possible liver damage.  Using more of this medication than prescribed may cause serious breathing problems.    -- Indication: For Post-operative Pain    lisinopril 10 mg oral tablet  -- 1 tab(s) by mouth once a day  -- Indication: For Home Meds    Janumet 50 mg-1000 mg oral tablet  -- 1 tab(s) by mouth 2 times a day  -- Indication: For Home Meds    amLODIPine 10 mg oral tablet  -- 1 tab(s) by mouth once a day  -- Indication: For Home Meds    Augmentin 875 mg-125 mg oral tablet  -- 1 tab(s) by mouth every 12 hours  -- Finish all this medication unless otherwise directed by prescriber.  Take with food or milk.    -- Indication: For Post-operative course

## 2017-07-10 NOTE — DISCHARGE NOTE ADULT - PLAN OF CARE
Recovery Please follow up with Dr. Chicas in clinic in one to two weeks. Please call [phone] to schedule your appointment.   You may take Tylenol for pain. Please follow up with Dr. Chicas in clinic in one to two weeks. Please call [phone] to schedule your appointment.   You may take Tylenol or Advil for pain every 6 hours. If your pain is severe, 7-10, on a 1-10 scale you may take Vicodin as directed.

## 2017-07-10 NOTE — DIETITIAN INITIAL EVALUATION ADULT. - ENERGY NEEDS
Height: 67" Weight: 161lbs, IBW 148lbs+/-10%, %%, BMI - 25.3  ABW used to calculate energy needs due to pt's current body weight within % IBW   Nutrient needs based on [facility] standards of care for maintenance in adults.

## 2017-07-10 NOTE — PROGRESS NOTE ADULT - SUBJECTIVE AND OBJECTIVE BOX
INTERVAL HPI/OVERNIGHT EVENTS:    SURGERY ATTENDING    STATUS POST:  LAPAROSCOPIC CHOLECYSTECTOMY, LYSIS OF  ADHESIONS, FOR GANGRENOUS GALLBLADDER, S/P ERCP     POST OPERATIVE DAY #:  4     SUBJECTIVE:  Flatus: [X ] YES [ ] NO             Bowel Movement: [X ] YES [ ] NO  Pain (0-10):        0    Pain Control Adequate: [X ] YES [ ] NO  Nausea: [ ] YES [X ] NO            Vomiting: [ ] YES [X ] NO  Diarrhea: [ ] YES [X ] NO         Constipation: [ ] YES [X ] NO     Chest Pain: [ ] YES [X ] NO    SOB:  [ ] YES [X ] NO    MEDICATIONS  (STANDING):  heparin  Injectable 5000 Unit(s) SubCutaneous every 8 hours  insulin lispro (HumaLOG) corrective regimen sliding scale   SubCutaneous Before meals and at bedtime  dextrose 5%. 1000 milliLiter(s) (50 mL/Hr) IV Continuous <Continuous>  dextrose 50% Injectable 12.5 Gram(s) IV Push once  dextrose 50% Injectable 25 Gram(s) IV Push once  dextrose 50% Injectable 25 Gram(s) IV Push once  piperacillin/tazobactam IVPB. 3.375 Gram(s) IV Intermittent every 6 hours  tamsulosin 0.4 milliGRAM(s) Oral at bedtime  lactated ringers. 1000 milliLiter(s) (125 mL/Hr) IV Continuous <Continuous>    MEDICATIONS  (PRN):  ondansetron Injectable 4 milliGRAM(s) IV Push every 6 hours PRN Nausea  dextrose Gel 1 Dose(s) Oral once PRN Blood Glucose LESS THAN 70 milliGRAM(s)/deciliter  glucagon  Injectable 1 milliGRAM(s) IntraMuscular once PRN Glucose LESS THAN 70 milligrams/deciliter  oxyCODONE    5 mG/acetaminophen 325 mG 1 Tablet(s) Oral every 4 hours PRN Moderate Pain (4 - 6)  oxyCODONE    5 mG/acetaminophen 325 mG 2 Tablet(s) Oral every 4 hours PRN Severe Pain (7 - 10)      Vital Signs Last 24 Hrs  T(C): 37.1 (10 Jul 2017 17:03), Max: 37.9 (10 Jul 2017 12:44)  T(F): 98.8 (10 Jul 2017 17:03), Max: 100.2 (10 Jul 2017 12:44)  HR: 108 (10 Jul 2017 17:03) (77 - 108)  BP: 124/76 (10 Jul 2017 17:03) (124/76 - 138/81)  BP(mean): --  RR: 17 (10 Jul 2017 17:03) (16 - 17)  SpO2: 95% (10 Jul 2017 17:03) (91% - 97%)    PHYSICAL EXAM:      Constitutional:    Eyes:    ENMT:    Neck:    Breasts:    Back:    Respiratory:    Cardiovascular:    Gastrointestinal:    Genitourinary:    Rectal:    Extremities:    Vascular:    Neurological:    Skin:    Lymph Nodes:    Musculoskeletal:    Psychiatric:        I&O's Detail    09 Jul 2017 07:01  -  10 Jul 2017 07:00  --------------------------------------------------------  IN:    lactated ringers.: 240 mL    lactated ringers.: 2250 mL    Oral Fluid: 920 mL    Solution: 400 mL    Solution: 100 mL  Total IN: 3910 mL    OUT:    Indwelling Catheter - Urethral: 5655 mL  Total OUT: 5655 mL    Total NET: -1745 mL      10 Jul 2017 07:01  -  10 Jul 2017 18:47  --------------------------------------------------------  IN:    Oral Fluid: 480 mL  Total IN: 480 mL    OUT:    Voided: 1200 mL  Total OUT: 1200 mL    Total NET: -720 mL          LABS:                        13.9   10.9  )-----------( 128      ( 10 Jul 2017 06:55 )             42.0     07-10    139  |  100  |  4<L>  ----------------------------<  149<H>  3.9   |  27  |  0.70    Ca    8.6      10 Jul 2017 06:57  Phos  2.3     07-10  Mg     1.3     07-10    TPro  6.1  /  Alb  3.1<L>  /  TBili  2.3<H>  /  DBili  x   /  AST  25  /  ALT  66<H>  /  AlkPhos  216<H>  07-10          RADIOLOGY & ADDITIONAL STUDIES:

## 2017-07-10 NOTE — DISCHARGE NOTE ADULT - ADDITIONAL INSTRUCTIONS
*You experience new chest pain, pressure, squeezing or tightness.  *New or worsening cough, shortness of breath, or wheeze.  *If you are vomiting and cannot keep down fluids or your medications.  *You see blood or dark/black material when you vomit or have a bowel movement.  *You experience burning when you urinate, have blood in your urine, or experience a discharge.    *Please call your doctor or nurse practitioner if you have increased pain, swelling, redness, or drainage from the incision site.  *Avoid swimming and baths until your follow-up appointment.  *You may shower, and wash surgical incisions with a mild soap and warm water. Gently pat the area dry.  *If you have staples, they will be removed at your follow-up appointment.  *If you have steri-strips, they will fall off on their own. Please remove any remaining strips 7-10 days after surgery.

## 2017-07-10 NOTE — PROGRESS NOTE ADULT - ASSESSMENT
65 y/o M s/p laparoscopic cholecystectomy for chronic cholecystitis, found to be gangrenous     - Pain control. Nausea control.   - IVF.   - IS.   - FLD  - I&Os.   - Zosyn.   - SSI.   - DVT prophylaxis. 67 y/o M s/p laparoscopic cholecystectomy for chronic cholecystitis, found to be gangrenous     - Pain control. Nausea control.   - IVF.   - IS.   - FLD  - I&Os.   - Zosyn.   - SSI.   - DVT prophylaxis.   - remove hernandez, TOV

## 2017-07-10 NOTE — PROGRESS NOTE ADULT - ASSESSMENT
66 year old male s/p cholecystectomy with rising T-bili, concern for retained biliary sludge. S/p ERCP with sphincterotomy and removal of biliary sludge. No evidence of retain CBD stones.  Repeat MRCP negative for choledocolithiasis. T-bili downtrending.    -Advance diet as tolerated  -LFTs, T bili continue to downtrend  -Care per primary team 66 year old male s/p cholecystectomy with rising T-bili, concern for retained biliary sludge. S/p ERCP with sphincterotomy and removal of biliary sludge. No evidence of retain CBD stones.  Repeat MRCP negative for choledocolithiasis, although fluid collection noted suggestive possible biloma. T-bili downtrending.    -Advance diet as tolerated  -LFTs, T bili continue to downtrend  -Consider HIDA scan   -Care per primary team

## 2017-07-10 NOTE — PROGRESS NOTE ADULT - ASSESSMENT
ABD SOFT, BS+, FLATUS+, BM+, WOUNDS DRY INTACT.  TOTAL BILIRUBIN 2.3 DOWN FROM 3.3, TOLERATING  FULL LIQUID DIET , IVF, IV ABX, DVT PROFILAXIS.  D/C HOME ON  PO ABX, F/U IN THE OFFICE

## 2017-07-10 NOTE — DISCHARGE NOTE ADULT - HOSPITAL COURSE
66YOM with HTN and DM (not insulin dependent) who presented for an elective lap cholecystectomy with chronic abdominal pain due to chronic cholecystitis. He was asymptomatic upon presentation in the ED. The patient was taken to the OR and found to have a gangrenous gallbladder. Mr. Catalan's Post-op check was within normal limits. On the first night of his admission he passed his Trial to Void, however his PVR was found to be 900 cc. As we monitored his labs, hibrook remained afebrile and normotensive, and continued to receive antibiotics. However, his total bilirubin did rise from 1.4 to 2.1 The patient was further worked up with an ERCP. The ERCP was done and sludge was removed, No stones in the CBD; post ERCP t.bili 3.9. LFT elevated. alk phos 165, ALT/AST 91/88. WBC trending down. adequate UOP. Mr. Catalan's vitals remained stable, however, due to his rising hepatic function labs, he received an MRCP, which showed post-operative changes vs. biloma. The patient was considered stable for discharge, and prescribed oral antibiotics, with instructions to follow-up with Dr. Chicas in clinic.

## 2017-07-10 NOTE — PROGRESS NOTE ADULT - SUBJECTIVE AND OBJECTIVE BOX
Pt seen and examined at bedside this am. No acute overnight events. Pt tolerated full liquids. Denies abd pain.      MEDICATIONS:  MEDICATIONS  (STANDING):  heparin  Injectable 5000 Unit(s) SubCutaneous every 8 hours  insulin lispro (HumaLOG) corrective regimen sliding scale   SubCutaneous Before meals and at bedtime  dextrose 5%. 1000 milliLiter(s) (50 mL/Hr) IV Continuous <Continuous>  dextrose 50% Injectable 12.5 Gram(s) IV Push once  dextrose 50% Injectable 25 Gram(s) IV Push once  dextrose 50% Injectable 25 Gram(s) IV Push once  piperacillin/tazobactam IVPB. 3.375 Gram(s) IV Intermittent every 6 hours  tamsulosin 0.4 milliGRAM(s) Oral at bedtime  lactated ringers. 1000 milliLiter(s) (125 mL/Hr) IV Continuous <Continuous>  potassium phosphate IVPB 15 milliMole(s) IV Intermittent once    MEDICATIONS  (PRN):  ondansetron Injectable 4 milliGRAM(s) IV Push every 6 hours PRN Nausea  dextrose Gel 1 Dose(s) Oral once PRN Blood Glucose LESS THAN 70 milliGRAM(s)/deciliter  glucagon  Injectable 1 milliGRAM(s) IntraMuscular once PRN Glucose LESS THAN 70 milligrams/deciliter  oxyCODONE    5 mG/acetaminophen 325 mG 1 Tablet(s) Oral every 4 hours PRN Moderate Pain (4 - 6)  oxyCODONE    5 mG/acetaminophen 325 mG 2 Tablet(s) Oral every 4 hours PRN Severe Pain (7 - 10)      Allergies    No Known Allergies    Intolerances        Vital Signs Last 24 Hrs  T(C): 37.1 (10 Jul 2017 05:31), Max: 37.4 (09 Jul 2017 20:02)  T(F): 98.7 (10 Jul 2017 05:31), Max: 99.4 (09 Jul 2017 20:02)  HR: 85 (10 Jul 2017 05:31) (77 - 98)  BP: 138/81 (10 Jul 2017 05:31) (125/74 - 149/80)  BP(mean): --  RR: 17 (10 Jul 2017 05:31) (16 - 18)  SpO2: 91% (10 Jul 2017 05:31) (91% - 96%)    07-09 @ 07:01  -  07-10 @ 07:00  --------------------------------------------------------  IN: 3910 mL / OUT: 5655 mL / NET: -1745 mL        PHYSICAL EXAM:    General: Well developed; well nourished; in no acute distress  HEENT: MMM, conjunctiva and sclera clear  Gastrointestinal: Soft non-tender non-distended; bs+    LABS:      CBC Full  -  ( 10 Jul 2017 06:55 )  WBC Count : 10.9 K/uL  Hemoglobin : 13.9 g/dL  Hematocrit : 42.0 %  Platelet Count - Automated : 128 K/uL  Mean Cell Volume : 92.3 fL  Mean Cell Hemoglobin : 30.5 pg  Mean Cell Hemoglobin Concentration : 33.1 g/dL  Auto Neutrophil # : x  Auto Lymphocyte # : x  Auto Monocyte # : x  Auto Eosinophil # : x  Auto Basophil # : x  Auto Neutrophil % : x  Auto Lymphocyte % : x  Auto Monocyte % : x  Auto Eosinophil % : x  Auto Basophil % : x    07-10    139  |  100  |  4<L>  ----------------------------<  149<H>  3.9   |  27  |  0.70    Ca    8.6      10 Jul 2017 06:57  Phos  2.3     07-10  Mg     1.3     07-10    TPro  6.1  /  Alb  3.1<L>  /  TBili  2.3<H>  /  DBili  x   /  AST  25  /  ALT  66<H>  /  AlkPhos  216<H>  07-10                      RADIOLOGY & ADDITIONAL STUDIES (The following images were personally reviewed):

## 2017-07-11 LAB — SURGICAL PATHOLOGY STUDY: SIGNIFICANT CHANGE UP

## 2017-07-12 DIAGNOSIS — K80.13 CALCULUS OF GALLBLADDER WITH ACUTE AND CHRONIC CHOLECYSTITIS WITH OBSTRUCTION: ICD-10-CM

## 2017-07-12 DIAGNOSIS — E11.9 TYPE 2 DIABETES MELLITUS WITHOUT COMPLICATIONS: ICD-10-CM

## 2017-07-12 DIAGNOSIS — K81.1 CHRONIC CHOLECYSTITIS: ICD-10-CM

## 2017-07-12 DIAGNOSIS — I10 ESSENTIAL (PRIMARY) HYPERTENSION: ICD-10-CM

## 2017-07-12 DIAGNOSIS — K91.86 RETAINED CHOLELITHIASIS FOLLOWING CHOLECYSTECTOMY: ICD-10-CM

## 2017-07-13 DIAGNOSIS — K83.8 OTHER SPECIFIED DISEASES OF BILIARY TRACT: ICD-10-CM

## 2017-07-13 DIAGNOSIS — Z79.84 LONG TERM (CURRENT) USE OF ORAL HYPOGLYCEMIC DRUGS: ICD-10-CM

## 2022-05-04 NOTE — PROGRESS NOTE ADULT - SUBJECTIVE AND OBJECTIVE BOX
No retinal holes or tears seen on exam. Recommended OBSERVATION. We reviewed the signs and symptoms of retinal tear/retinal detachment and the importance of prompt evaluation should there be increasing floaters, new flashing lights, or decreasing peripheral vision in either eye at any time. Patient understands condition, prognosis and need for follow up care. O/N: POC wnl. passed TOV with 300cc, but PVR was 900, placed hernandez to gravity 2L of urine in 2 hours, started Flomax as per Dr. Chicas.  no growth from intraop fluid collection. postop labs. mag 1.2 was repleted. T.bili 1.4, WBC 14.6 from 19.2.  7/06: S/p lap cholecystectomy for gangrenous GB    status post: Lap bowen    POD # 1 O/N: POC wnl. passed TOV with 300cc, but PVR was 900, placed hernandez to gravity 2L of urine in 2 hours, started Flomax as per Dr. Chicas.  no growth from intraop fluid collection. postop labs. mag 1.2 was repleted. T.bili 1.4, WBC 14.6 from 19.2.  7/06: S/p lap cholecystectomy for gangrenous GB    status post: Lap bowen    POD # 1     SUBJECTIVE: Patient seen and examined bedside by chief resident. Patient is comfortable    heparin  Injectable 5000 Unit(s) SubCutaneous every 8 hours  piperacillin/tazobactam IVPB. 3.375 Gram(s) IV Intermittent every 6 hours  tamsulosin 0.4 milliGRAM(s) Oral at bedtime      Vital Signs Last 24 Hrs  T(C): 36.4 (07 Jul 2017 05:29), Max: 37.1 (06 Jul 2017 20:56)  T(F): 97.6 (07 Jul 2017 05:29), Max: 98.7 (06 Jul 2017 20:56)  HR: 82 (07 Jul 2017 05:29) (82 - 97)  BP: 116/67 (07 Jul 2017 05:29) (107/63 - 116/67)  BP(mean): --  RR: 16 (07 Jul 2017 05:29) (16 - 16)  SpO2: 94% (07 Jul 2017 05:29) (94% - 96%)  I&O's Detail    06 Jul 2017 07:01  -  07 Jul 2017 07:00  --------------------------------------------------------  IN:    lactated ringers.: 840 mL    Solution: 100 mL    Solution: 100 mL  Total IN: 1040 mL    OUT:    Indwelling Catheter - Urethral: 3350 mL  Total OUT: 3350 mL    Total NET: -2310 mL          General: NAD, resting comfortably in bed  C/V: NSR  Pulm: Nonlabored breathing, no respiratory distress  Abd: soft, NT/ND. Operative Incisions I/C/D  Urological: Hernandez in place.  Extrem: WWP, no edema, SCDs in place        LABS:                        14.7   12.2  )-----------( 152      ( 07 Jul 2017 07:13 )             43.4     07-07    137  |  99  |  7   ----------------------------<  154<H>  4.1   |  26  |  0.90    Ca    8.6      07 Jul 2017 07:13  Phos  3.2     07-07  Mg     2.6     07-07    TPro  6.2  /  Alb  3.4  /  TBili  2.1<H>  /  DBili  x   /  AST  60<H>  /  ALT  78<H>  /  AlkPhos  108  07-07

## 2023-04-08 ENCOUNTER — NON-APPOINTMENT (OUTPATIENT)
Age: 73
End: 2023-04-08

## 2023-06-06 PROBLEM — E11.9 TYPE 2 DIABETES MELLITUS WITHOUT COMPLICATIONS: Chronic | Status: ACTIVE | Noted: 2017-06-04

## 2023-06-06 PROBLEM — I10 ESSENTIAL (PRIMARY) HYPERTENSION: Chronic | Status: ACTIVE | Noted: 2017-06-04

## 2023-07-27 ENCOUNTER — APPOINTMENT (OUTPATIENT)
Dept: OPHTHALMOLOGY | Facility: CLINIC | Age: 73
End: 2023-07-27
Payer: COMMERCIAL

## 2023-07-27 ENCOUNTER — NON-APPOINTMENT (OUTPATIENT)
Age: 73
End: 2023-07-27

## 2023-07-27 PROCEDURE — 92004 COMPRE OPH EXAM NEW PT 1/>: CPT

## 2023-07-27 PROCEDURE — 92136 OPHTHALMIC BIOMETRY: CPT

## 2023-11-08 ENCOUNTER — APPOINTMENT (OUTPATIENT)
Dept: OPHTHALMOLOGY | Facility: AMBULATORY SURGERY CENTER | Age: 73
End: 2023-11-08

## 2023-11-09 ENCOUNTER — APPOINTMENT (OUTPATIENT)
Dept: OPHTHALMOLOGY | Facility: CLINIC | Age: 73
End: 2023-11-09

## 2023-11-15 ENCOUNTER — APPOINTMENT (OUTPATIENT)
Dept: OPHTHALMOLOGY | Facility: CLINIC | Age: 73
End: 2023-11-15

## 2025-02-12 ENCOUNTER — NON-APPOINTMENT (OUTPATIENT)
Age: 75
End: 2025-02-12

## 2025-02-13 ENCOUNTER — APPOINTMENT (OUTPATIENT)
Dept: UROLOGY | Facility: CLINIC | Age: 75
End: 2025-02-13
Payer: COMMERCIAL

## 2025-02-13 VITALS
WEIGHT: 170 LBS | HEIGHT: 67 IN | SYSTOLIC BLOOD PRESSURE: 124 MMHG | DIASTOLIC BLOOD PRESSURE: 70 MMHG | BODY MASS INDEX: 26.68 KG/M2 | HEART RATE: 108 BPM | TEMPERATURE: 97.3 F

## 2025-02-13 DIAGNOSIS — N52.9 MALE ERECTILE DYSFUNCTION, UNSPECIFIED: ICD-10-CM

## 2025-02-13 DIAGNOSIS — N40.0 BENIGN PROSTATIC HYPERPLASIA WITHOUT LOWER URINARY TRACT SYMPMS: ICD-10-CM

## 2025-02-13 PROCEDURE — G2211 COMPLEX E/M VISIT ADD ON: CPT | Mod: NC

## 2025-02-13 PROCEDURE — 99204 OFFICE O/P NEW MOD 45 MIN: CPT

## 2025-02-13 RX ORDER — TAMSULOSIN HYDROCHLORIDE 0.4 MG/1
0.4 CAPSULE ORAL
Qty: 90 | Refills: 3 | Status: ACTIVE | COMMUNITY
Start: 2025-02-13 | End: 1900-01-01

## 2025-02-13 RX ORDER — TADALAFIL 20 MG/1
20 TABLET ORAL
Qty: 30 | Refills: 3 | Status: ACTIVE | COMMUNITY
Start: 2025-02-13 | End: 1900-01-01

## 2025-02-17 LAB
APPEARANCE: CLEAR
BACTERIA UR CULT: NORMAL
BACTERIA: NEGATIVE /HPF
BILIRUBIN URINE: NEGATIVE
BLOOD URINE: NEGATIVE
CAST: 1 /LPF
COLOR: NORMAL
EPITHELIAL CELLS: 1 /HPF
GLUCOSE QUALITATIVE U: >=1000 MG/DL
KETONES URINE: ABNORMAL MG/DL
LEUKOCYTE ESTERASE URINE: NEGATIVE
MICROSCOPIC-UA: NORMAL
NITRITE URINE: NEGATIVE
PH URINE: 5
PROTEIN URINE: 30 MG/DL
RED BLOOD CELLS URINE: 2 /HPF
SPECIFIC GRAVITY URINE: >1.03
UROBILINOGEN URINE: 0.2 MG/DL
WHITE BLOOD CELLS URINE: 0 /HPF

## 2025-02-21 ENCOUNTER — EMERGENCY (EMERGENCY)
Facility: HOSPITAL | Age: 75
LOS: 1 days | Discharge: HOME | End: 2025-02-21
Attending: EMERGENCY MEDICINE | Admitting: EMERGENCY MEDICINE
Payer: COMMERCIAL

## 2025-02-21 VITALS
DIASTOLIC BLOOD PRESSURE: 87 MMHG | WEIGHT: 169.98 LBS | RESPIRATION RATE: 18 BRPM | SYSTOLIC BLOOD PRESSURE: 170 MMHG | HEART RATE: 117 BPM | TEMPERATURE: 98 F | OXYGEN SATURATION: 96 % | HEIGHT: 67 IN

## 2025-02-21 VITALS
DIASTOLIC BLOOD PRESSURE: 75 MMHG | OXYGEN SATURATION: 97 % | SYSTOLIC BLOOD PRESSURE: 135 MMHG | HEART RATE: 116 BPM | RESPIRATION RATE: 16 BRPM | TEMPERATURE: 98 F | HEIGHT: 67 IN

## 2025-02-21 VITALS
DIASTOLIC BLOOD PRESSURE: 68 MMHG | SYSTOLIC BLOOD PRESSURE: 115 MMHG | TEMPERATURE: 98 F | HEART RATE: 99 BPM | RESPIRATION RATE: 18 BRPM | OXYGEN SATURATION: 96 %

## 2025-02-21 VITALS
TEMPERATURE: 98 F | OXYGEN SATURATION: 96 % | DIASTOLIC BLOOD PRESSURE: 74 MMHG | HEART RATE: 96 BPM | SYSTOLIC BLOOD PRESSURE: 134 MMHG | RESPIRATION RATE: 17 BRPM

## 2025-02-21 DIAGNOSIS — R33.9 RETENTION OF URINE, UNSPECIFIED: ICD-10-CM

## 2025-02-21 DIAGNOSIS — N40.1 BENIGN PROSTATIC HYPERPLASIA WITH LOWER URINARY TRACT SYMPTOMS: ICD-10-CM

## 2025-02-21 DIAGNOSIS — T83.031A LEAKAGE OF INDWELLING URETHRAL CATHETER, INITIAL ENCOUNTER: ICD-10-CM

## 2025-02-21 DIAGNOSIS — R33.8 OTHER RETENTION OF URINE: ICD-10-CM

## 2025-02-21 LAB
APPEARANCE UR: CLEAR — SIGNIFICANT CHANGE UP
BILIRUB UR-MCNC: NEGATIVE — SIGNIFICANT CHANGE UP
COLOR SPEC: YELLOW — SIGNIFICANT CHANGE UP
DIFF PNL FLD: NEGATIVE — SIGNIFICANT CHANGE UP
GLUCOSE UR QL: NEGATIVE MG/DL — SIGNIFICANT CHANGE UP
KETONES UR-MCNC: NEGATIVE MG/DL — SIGNIFICANT CHANGE UP
LEUKOCYTE ESTERASE UR-ACNC: NEGATIVE — SIGNIFICANT CHANGE UP
NITRITE UR-MCNC: NEGATIVE — SIGNIFICANT CHANGE UP
PH UR: 6.5 — SIGNIFICANT CHANGE UP (ref 5–8)
PROT UR-MCNC: NEGATIVE MG/DL — SIGNIFICANT CHANGE UP
SP GR SPEC: 1.01 — SIGNIFICANT CHANGE UP (ref 1–1.03)
UROBILINOGEN FLD QL: 0.2 MG/DL — SIGNIFICANT CHANGE UP (ref 0.2–1)

## 2025-02-21 PROCEDURE — 99053 MED SERV 10PM-8AM 24 HR FAC: CPT

## 2025-02-21 PROCEDURE — 99283 EMERGENCY DEPT VISIT LOW MDM: CPT | Mod: 25

## 2025-02-21 PROCEDURE — 51702 INSERT TEMP BLADDER CATH: CPT

## 2025-02-21 PROCEDURE — 99283 EMERGENCY DEPT VISIT LOW MDM: CPT

## 2025-02-21 PROCEDURE — L9997: CPT

## 2025-02-21 PROCEDURE — 99282 EMERGENCY DEPT VISIT SF MDM: CPT

## 2025-02-21 PROCEDURE — 81003 URINALYSIS AUTO W/O SCOPE: CPT

## 2025-02-21 NOTE — ED PROVIDER NOTE - PHYSICAL EXAMINATION
VITAL SIGNS: I have reviewed nursing notes and confirm.  CONSTITUTIONAL: Well-developed; well-nourished; in no acute distress.  SKIN: Agree with RN documentation regarding decubitus evaluation. Remainder of skin exam is warm and dry, no acute rash.  HEAD: Normocephalic; atraumatic.  EYES: PERRL, EOM intact; conjunctiva and sclera clear.  ENT: No nasal discharge; airway clear.  NECK: Supple; non tender.  CARD: S1, S2 normal; no murmurs, gallops, or rubs. Regular rate and rhythm.  RESP: No wheezes, rales or rhonchi.  ABD: Normal bowel sounds; soft; non-distended; non-tender; no hepatosplenomegaly.  : leakage of urine around tip penis  EXT: Normal ROM. No clubbing, cyanosis or edema.  LYMPH: No acute cervical adenopathy.  NEURO: Alert, oriented. Grossly unremarkable.  PSYCH: Cooperative, appropriate.

## 2025-02-21 NOTE — ED PROVIDER NOTE - NSFOLLOWUPINSTRUCTIONS_ED_ALL_ED_FT
Call Dr. Cerna today to make appt for next week for voiding trial and possibly hernandez removal  Continue to take flomax as prescribed.  Return to ED with any hernandez catheter issue.  Feel better soon.    Trouble Peeing (Acute Urinary Retention) in Males: What to Know  Acute urinary retention is when a person isn't able to pee or can only pass a little pee. This can happen all of a sudden and may last for a short time. But if it's not treated, it can become long-term and lead to kidney damage or other serious changes.    What are the causes?  Male lower body area, showing the bladder, prostate gland, and urethra.   Acute urinary retention may be caused by:  A blocked or narrowed urethra, which is the tube that drains the bladder. This may be caused by surgery, problems with nearby organs, or injury to the bladder or urethra.  Problems with the nerves in the bladder.  Tumors in the area of the pelvis, bladder, or urethra.  Certain medicines.  Infections in the bladder or urinary tract.  Trouble pooping (constipation).  What increases the risk?  Older males are more likely to get this condition. As males age, their prostate may get bigger and press on the bladder or urethra.    Other health problems can also raise the risk of acute urinary retention. These include:  Multiple sclerosis.  Spinal cord injuries.  Diabetes.  Dementia or confusion that's new or long-term.  Mental health problems.  Having urinary retention before.  What are the signs or symptoms?  Symptoms include:  Trouble peeing.  Pain in the lower belly.  How is this diagnosed?  Acute urinary retention is diagnosed based on a physical exam and your medical history. You may also have tests. These may include:  An ultrasound of the bladder or kidneys or both.  Blood tests.  Pee (urine) tests.  Other tests may be needed, such as a CT scan, MRI, and kidney or bladder function tests.    How is this treated?  Treatment may include:  Medicines.  Treatment for other health problems, such as prostate enlargement.  Placing a soft tube called a catheter into the bladder to drain pee out of the body.  Behavioral therapy. This is to help with mental health issues.  If needed, you may be treated in the hospital for kidney problems.    Follow these instructions at home:  Medicines    Take medicines only as told. Do not take any medicine unless your health care provider approves.  If you were given antibiotics, take them as told. Do not stop taking them even if you start to feel better.  General instructions    Do not smoke, vape, or use nicotine or tobacco.  Drink more fluids as told.  If you need to use a catheter, follow the instructions you were given closely. This will help prevent a bladder infection.  If told, monitor your blood pressure at home. Report changes to your provider.  Contact a health care provider if:  You have uncomfortable bladder cramping, known as spasms.  You leak pee with the spasms.  You have a fever or chills.  You have blood in your pee.  Get help right away if:  You can't pee.  This information is not intended to replace advice given to you by your health care provider. Make sure you discuss any questions you have with your health care provider.

## 2025-02-21 NOTE — ED ADULT NURSE REASSESSMENT NOTE - NS ED NURSE REASSESS COMMENT FT1
16Fr hernandez removed, pt urinating freely, no complaints voiced, ongoing monitoring. 16Fr hernandez removed, pt urinating freely, no complaints voiced, Dr. hewitt made aware of same, ongoing monitoring.

## 2025-02-21 NOTE — ED ADULT NURSE NOTE - CHIEF COMPLAINT QUOTE
Hernandez placed this morning d/t urinary retention. Pt now reports passing urine around the hernandez catheter with urination w/ additional constant leaking. Reports irritation with urination but denies new pain or discomfort. Alert, oriented, and ambulatory.

## 2025-02-21 NOTE — ED PROVIDER NOTE - CARE PROVIDER_API CALL
Ken Cerna.  Urology  [address]  [place] [address]  Phone: [phone]  Fax: [phone]  Follow Up Time:

## 2025-02-21 NOTE — ED ADULT NURSE NOTE - NSFALLUNIVINTERV_ED_ALL_ED
Bed/Stretcher in lowest position, wheels locked, appropriate side rails in place/Call bell, personal items and telephone in reach/Instruct patient to call for assistance before getting out of bed/chair/stretcher/Non-slip footwear applied when patient is off stretcher/[place] to call system/Physically safe environment - no spills, clutter or unnecessary equipment/Purposeful proactive rounding/Room/bathroom lighting operational, light cord in reach

## 2025-02-21 NOTE — ED PROVIDER NOTE - CARE PROVIDERS DIRECT ADDRESSES
[email or web address]

## 2025-02-21 NOTE — ED PROVIDER NOTE - CLINICAL SUMMARY MEDICAL DECISION MAKING FREE TEXT BOX
75 yo m with PMH of BPH presents to ED with urinary retention - last able to use the bathroom at 8pm night prior.  No prior hx of requiring catheter.  Seen by Dr. Cerna on 2/13 for routine PSA testing.  Pt on flomax daily.  No fever, chills, dysuria.      VSS  PE - comfortable appearing on my eval as hernandez already placed by nurse, prior provider bladder scan with 600cc   UA neg  Pt to be discharged with hernandez, continue flomax and follow up with urology  Pt understands to make appt today

## 2025-02-21 NOTE — ED PROVIDER NOTE - PATIENT PORTAL LINK FT
You can access the FollowMyHealth Patient Portal offered by [facility] by registering at the following website: [email or web address]. By joining [employer]’s FollowMyHealth portal, you will also be able to view your health information using other applications (apps) compatible with our system.

## 2025-02-21 NOTE — ED PROVIDER NOTE - NSFOLLOWUPINSTRUCTIONS_ED_ALL_ED_FT
Continue to take flomax (tamsulosin) daily.  Follow up with Dr. Cerna this week.  Return to ED with any difficulty urinating or other concerns.    Urinary Retention in Men    WHAT YOU NEED TO KNOW:    What is urinary retention? Urinary retention is a condition that develops when your bladder does not empty completely when you urinate.  Male Reproductive System    What causes urinary retention?    An enlarged prostate    Blockages, such as a stone, growth, or narrowing of your urethra    A weak bladder muscle    Nerve damage from diabetes, stroke, or spinal cord injury    Bladder diverticula, which are pockets of urine that form in your bladder and do not empty    Certain medicines, such as narcotics, antihistamines, or antidepressants  What are the signs and symptoms of urinary retention?    Frequent urination, or the urge to urinate right after you finish    An urge to urinate, but your urine does not come out or dribbles out slowly and weakly    Frequent urine leaks that happen during the day or while you sleep    Pain or pressure when you urinate    Pain or stiffness in your abdomen, lower back, hips, or upper thighs    Blood in your urine  How is urinary retention diagnosed? Your healthcare provider will ask about your health history and the medicines you take. Your provider will press or tap on your lower abdomen. You may also need any of the following tests:    A digital rectal exam is when healthcare providers carefully feel the size of your prostate.    A post void residual test will show how much urine is left in your bladder after you urinate. You will be asked to urinate and then healthcare providers will use a small ultrasound machine to check how much urine is left in your bladder.    Blood or urine tests may show infection or prostate specific antigen (PSA) levels. PSA may be elevated in prostate cancer.    An ultrasound uses sound waves to show pictures on a monitor. An ultrasound may be done to show bladder stones, infection, or other problems.    A CT scan, or CAT scan, is a type of x-ray that is taken of your prostate, kidneys, and bladder. The pictures may show what is causing your urinary retention. You may be given a dye before the pictures are taken to help healthcare providers see the pictures better. Tell the healthcare provider if you have ever had an allergic reaction to contrast dye.  How is urinary retention treated?    A Guillermo catheter is a tube put into your bladder to drain urine into a bag. Keep the bag below your waist. This will prevent urine from flowing back into your bladder and causing an infection or other problems. Also, keep the tube free of kinks so the urine will drain properly. Do not pull on the catheter. This can cause pain and bleeding, and may cause the catheter to come out.    Medicines can help decrease the size of your prostate, fight infection, and help you urinate more easily.    Surgery may be needed to treat the condition that is causing your urinary retention.  When should I contact my healthcare provider?    You have a fever.    You have pain when you urinate.    You have blood in your urine.    You have problems with your catheter.    You have questions or concerns about your condition or care.  When should I seek immediate care or call 911?    You have severe abdominal pain.    You are breathing faster than usual.    Your heartbeat is faster than usual.    Your face, hands, feet, or ankles are swollen.  CARE AGREEMENT:    You have the right to help plan your care. Learn about your health condition and how it may be treated. Discuss treatment options with your healthcare providers to decide what care you want to receive. You always have the right to refuse treatment.    © [employer] 1973, 2025    	  back to top            © [employer] 1973, 2025

## 2025-02-21 NOTE — ED ADULT TRIAGE NOTE - CHIEF COMPLAINT QUOTE
Hernandez placed this morning d/t urinary retention. Pt now reports passing urine around the hernandez catheter with urination w/ constant leaking. Reports irritation with urination but denies new pain or discomfort. Alert, oriented, and ambulatory. Hernandez placed this morning d/t urinary retention. Pt now reports passing urine around the hernandez catheter with urination w/ additional constant leaking. Reports irritation with urination but denies new pain or discomfort. Alert, oriented, and ambulatory.

## 2025-02-21 NOTE — ED PROVIDER NOTE - PATIENT PORTAL LINK FT
You can access the FollowMyHealth Patient Portal offered by [facility] by registering at the following website: [email or web address]. By joining [employer]’s FollowMyHealth portal, you will also be able to view your health information using other applications (apps) compatible with our system.

## 2025-02-21 NOTE — ED ADULT NURSE NOTE - OBJECTIVE STATEMENT
pt reports having hernandez cath placed this morning but is leaking around catheter, denies any pain

## 2025-02-21 NOTE — ED PROVIDER NOTE - OBJECTIVE STATEMENT
75 y/o m seen earlier in ED for urinary retention presents to ED with leakage around insertion tip.  Pt denies other complaints.

## 2025-02-21 NOTE — ED PROVIDER NOTE - CARE PROVIDER_API CALL
Ken Cerna.  Urology  [address]  [place] [address]  Phone: [phone]  Fax: [phone]  Follow Up Time:

## 2025-02-21 NOTE — ED ADULT NURSE NOTE - OBJECTIVE STATEMENT
74y male pt c/o urinary retention that started today morning around 3am. Pt denies chest pain, nausea or vomiting, AAOx4.

## 2025-02-21 NOTE — ED PROVIDER NOTE - OBJECTIVE STATEMENT
73 yo m with PMH of BPH presents to ED with urinary retention - last able to use the bathroom at 8pm night prior.  No prior hx of requiring catheter.  Seen by Dr. Cerna on 2/13 for routine PSA testing.  Pt on flomax daily.  No fever, chills, dysuria.

## 2025-02-21 NOTE — ED PROVIDER NOTE - CLINICAL SUMMARY MEDICAL DECISION MAKING FREE TEXT BOX
75 y/o m seen earlier in ED for urinary retention presents to ED with leakage around insertion tip.  Pt denies other complaints.    VSS  PE - small amount of urine around tip  Guillermo removed and pt urinating freely in ED without issues - observed for several hours with multiple episodes of urination  Pt aware of follow up with urology and return for urinary retention

## 2025-05-12 NOTE — H&P ADULT - PROBLEM/PLAN-1
Thank you for choosing [facility] for [facility] healthcare needs.  It was our pleasure to take care of you today!  Please follow the instructions provided to you after your procedure.    YOUR CARE TEAM TODAY WAS: Deana BONNER    HANDOUTS GIVEN:  Overview of Your Anesthesia  Tips for Feeling Better After Your Extremity Surgery    If you have any questions or concerns, please call the Outpatient Surgery unit at [phone] Monday through Friday 6 AM to 6 PM. If you are unable to reach someone, please call your physician's office.     You may receive a patient satisfaction survey in the mail or by email following your visit.  Please take the time to complete this, as your feedback is very important to us.  We strive to make your experience exceptional and your comments help us with that goal.  We look forward to hearing from you!        Want to Say “Thank You” to a Nurse?  The K[employer]NY Award® was created in memory of VANNESA Willinhgam by his family to say thank you to bedside nurses who provide an outstanding level of care.    Submit a nomination using any method below.     OR    [email or web address]  Or visit the Resource section   on your [employer] rekha         DISPLAY PLAN FREE TEXT

## 2025-08-11 ENCOUNTER — NON-APPOINTMENT (OUTPATIENT)
Age: 75
End: 2025-08-11

## 2025-08-12 ENCOUNTER — APPOINTMENT (OUTPATIENT)
Dept: UROLOGY | Facility: CLINIC | Age: 75
End: 2025-08-12